# Patient Record
Sex: FEMALE | Race: WHITE | Employment: PART TIME | URBAN - NONMETROPOLITAN AREA
[De-identification: names, ages, dates, MRNs, and addresses within clinical notes are randomized per-mention and may not be internally consistent; named-entity substitution may affect disease eponyms.]

---

## 2017-09-06 ENCOUNTER — HOSPITAL ENCOUNTER (INPATIENT)
Age: 37
LOS: 5 days | Discharge: NURSING, REHAB OR LONG-TERM CARE | DRG: 912 | End: 2017-09-11
Attending: INTERNAL MEDICINE | Admitting: SURGERY
Payer: MEDICARE

## 2017-09-06 ENCOUNTER — APPOINTMENT (OUTPATIENT)
Dept: GENERAL RADIOLOGY | Age: 37
DRG: 912 | End: 2017-09-06
Payer: MEDICARE

## 2017-09-06 ENCOUNTER — APPOINTMENT (OUTPATIENT)
Dept: CT IMAGING | Age: 37
DRG: 912 | End: 2017-09-06
Payer: MEDICARE

## 2017-09-06 DIAGNOSIS — S61.412A LACERATION OF LEFT HAND WITHOUT FOREIGN BODY, INITIAL ENCOUNTER: ICD-10-CM
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
## 2017-09-06 DIAGNOSIS — S82.141A TIBIAL PLATEAU FRACTURE, RIGHT, CLOSED, INITIAL ENCOUNTER: ICD-10-CM

## 2017-09-06 DIAGNOSIS — Z91.89: ICD-10-CM

## 2017-09-06 LAB
HEMOGLOBIN: 12.7 GM/DL (ref 12–16)
MRSA SCREEN RT-PCR: NEGATIVE

## 2017-09-06 PROCEDURE — 36415 COLL VENOUS BLD VENIPUNCTURE: CPT

## 2017-09-06 PROCEDURE — 2060000000 HC ICU INTERMEDIATE R&B

## 2017-09-06 PROCEDURE — 96374 THER/PROPH/DIAG INJ IV PUSH: CPT

## 2017-09-06 PROCEDURE — 73560 X-RAY EXAM OF KNEE 1 OR 2: CPT

## 2017-09-06 PROCEDURE — 6820000001 HC L2 TRAUMA SURGERY EVALUATION

## 2017-09-06 PROCEDURE — 99285 EMERGENCY DEPT VISIT HI MDM: CPT

## 2017-09-06 PROCEDURE — 96375 TX/PRO/DX INJ NEW DRUG ADDON: CPT

## 2017-09-06 PROCEDURE — 87081 CULTURE SCREEN ONLY: CPT

## 2017-09-06 PROCEDURE — 6360000004 HC RX CONTRAST MEDICATION: Performed by: INTERNAL MEDICINE

## 2017-09-06 PROCEDURE — 99223 1ST HOSP IP/OBS HIGH 75: CPT | Performed by: SURGERY

## 2017-09-06 PROCEDURE — 73080 X-RAY EXAM OF ELBOW: CPT

## 2017-09-06 PROCEDURE — 74177 CT ABD & PELVIS W/CONTRAST: CPT

## 2017-09-06 PROCEDURE — 72072 X-RAY EXAM THORAC SPINE 3VWS: CPT

## 2017-09-06 PROCEDURE — C9113 INJ PANTOPRAZOLE SODIUM, VIA: HCPCS | Performed by: SURGERY

## 2017-09-06 PROCEDURE — 6370000000 HC RX 637 (ALT 250 FOR IP): Performed by: SURGERY

## 2017-09-06 PROCEDURE — 6360000002 HC RX W HCPCS: Performed by: SURGERY

## 2017-09-06 PROCEDURE — 2580000003 HC RX 258: Performed by: SURGERY

## 2017-09-06 PROCEDURE — 76376 3D RENDER W/INTRP POSTPROCES: CPT

## 2017-09-06 PROCEDURE — 0LQ80ZZ REPAIR LEFT HAND TENDON, OPEN APPROACH: ICD-10-PCS | Performed by: ORTHOPAEDIC SURGERY

## 2017-09-06 PROCEDURE — 85018 HEMOGLOBIN: CPT

## 2017-09-06 PROCEDURE — A6454 SELF-ADHER BAND W>=3" <5"/YD: HCPCS

## 2017-09-06 PROCEDURE — 87641 MR-STAPH DNA AMP PROBE: CPT

## 2017-09-06 PROCEDURE — 6360000002 HC RX W HCPCS: Performed by: INTERNAL MEDICINE

## 2017-09-06 RX ORDER — VENLAFAXINE 37.5 MG/1
75 TABLET ORAL DAILY
Status: DISCONTINUED | OUTPATIENT
Start: 2017-09-06 | End: 2017-09-11 | Stop reason: HOSPADM

## 2017-09-06 RX ORDER — ACETAMINOPHEN 500 MG
1000 TABLET ORAL EVERY 6 HOURS PRN
Status: ON HOLD | COMMUNITY
End: 2017-09-11

## 2017-09-06 RX ORDER — ONDANSETRON 2 MG/ML
4 INJECTION INTRAMUSCULAR; INTRAVENOUS ONCE
Status: COMPLETED | OUTPATIENT
Start: 2017-09-06 | End: 2017-09-06

## 2017-09-06 RX ORDER — SODIUM CHLORIDE 0.9 % (FLUSH) 0.9 %
10 SYRINGE (ML) INJECTION EVERY 12 HOURS SCHEDULED
Status: DISCONTINUED | OUTPATIENT
Start: 2017-09-06 | End: 2017-09-11 | Stop reason: HOSPADM

## 2017-09-06 RX ORDER — SODIUM CHLORIDE 9 MG/ML
INJECTION, SOLUTION INTRAVENOUS CONTINUOUS
Status: DISCONTINUED | OUTPATIENT
Start: 2017-09-06 | End: 2017-09-07

## 2017-09-06 RX ORDER — LIDOCAINE 50 MG/G
2 PATCH TOPICAL DAILY
Status: DISCONTINUED | OUTPATIENT
Start: 2017-09-06 | End: 2017-09-07

## 2017-09-06 RX ORDER — ONDANSETRON 2 MG/ML
4 INJECTION INTRAMUSCULAR; INTRAVENOUS EVERY 6 HOURS PRN
Status: DISCONTINUED | OUTPATIENT
Start: 2017-09-06 | End: 2017-09-11 | Stop reason: HOSPADM

## 2017-09-06 RX ORDER — SODIUM CHLORIDE 0.9 % (FLUSH) 0.9 %
10 SYRINGE (ML) INJECTION PRN
Status: DISCONTINUED | OUTPATIENT
Start: 2017-09-06 | End: 2017-09-11 | Stop reason: HOSPADM

## 2017-09-06 RX ORDER — VENLAFAXINE 75 MG/1
75 TABLET ORAL DAILY
COMMUNITY

## 2017-09-06 RX ORDER — 0.9 % SODIUM CHLORIDE 0.9 %
10 VIAL (ML) INJECTION DAILY
Status: DISCONTINUED | OUTPATIENT
Start: 2017-09-06 | End: 2017-09-07

## 2017-09-06 RX ORDER — IBUPROFEN 200 MG
200 TABLET ORAL EVERY 6 HOURS PRN
Status: ON HOLD | COMMUNITY
End: 2017-09-11

## 2017-09-06 RX ORDER — ACETAMINOPHEN 325 MG/1
650 TABLET ORAL EVERY 4 HOURS PRN
Status: DISCONTINUED | OUTPATIENT
Start: 2017-09-06 | End: 2017-09-11 | Stop reason: HOSPADM

## 2017-09-06 RX ORDER — KETOROLAC TROMETHAMINE 30 MG/ML
30 INJECTION, SOLUTION INTRAMUSCULAR; INTRAVENOUS EVERY 6 HOURS PRN
Status: DISCONTINUED | OUTPATIENT
Start: 2017-09-06 | End: 2017-09-11 | Stop reason: HOSPADM

## 2017-09-06 RX ORDER — PANTOPRAZOLE SODIUM 40 MG/10ML
40 INJECTION, POWDER, LYOPHILIZED, FOR SOLUTION INTRAVENOUS DAILY
Status: DISCONTINUED | OUTPATIENT
Start: 2017-09-06 | End: 2017-09-07

## 2017-09-06 RX ADMIN — HYDROMORPHONE HYDROCHLORIDE 1 MG: 1 INJECTION, SOLUTION INTRAMUSCULAR; INTRAVENOUS; SUBCUTANEOUS at 17:20

## 2017-09-06 RX ADMIN — HYDROMORPHONE HYDROCHLORIDE 1 MG: 1 INJECTION, SOLUTION INTRAMUSCULAR; INTRAVENOUS; SUBCUTANEOUS at 23:59

## 2017-09-06 RX ADMIN — PANTOPRAZOLE SODIUM 40 MG: 40 INJECTION, POWDER, FOR SOLUTION INTRAVENOUS at 21:32

## 2017-09-06 RX ADMIN — IOPAMIDOL 80 ML: 755 INJECTION, SOLUTION INTRAVENOUS at 16:26

## 2017-09-06 RX ADMIN — HYDROMORPHONE HYDROCHLORIDE 1 MG: 1 INJECTION, SOLUTION INTRAMUSCULAR; INTRAVENOUS; SUBCUTANEOUS at 19:41

## 2017-09-06 RX ADMIN — Medication 10 ML: at 21:32

## 2017-09-06 RX ADMIN — HYDROMORPHONE HYDROCHLORIDE 1 MG: 1 INJECTION, SOLUTION INTRAMUSCULAR; INTRAVENOUS; SUBCUTANEOUS at 21:54

## 2017-09-06 RX ADMIN — ONDANSETRON 4 MG: 2 INJECTION INTRAMUSCULAR; INTRAVENOUS at 17:20

## 2017-09-06 RX ADMIN — VENLAFAXINE 75 MG: 37.5 TABLET ORAL at 21:32

## 2017-09-06 RX ADMIN — SODIUM CHLORIDE: 9 INJECTION, SOLUTION INTRAVENOUS at 21:31

## 2017-09-06 ASSESSMENT — PAIN SCALES - GENERAL
PAINLEVEL_OUTOF10: 8
PAINLEVEL_OUTOF10: 7
PAINLEVEL_OUTOF10: 9
PAINLEVEL_OUTOF10: 8
PAINLEVEL_OUTOF10: 9
PAINLEVEL_OUTOF10: 9
PAINLEVEL_OUTOF10: 8
PAINLEVEL_OUTOF10: 10
PAINLEVEL_OUTOF10: 8

## 2017-09-06 ASSESSMENT — ENCOUNTER SYMPTOMS
ABDOMINAL PAIN: 0
EYE DISCHARGE: 0
SORE THROAT: 0
VOMITING: 0
DIARRHEA: 0
WHEEZING: 0
NAUSEA: 0
COUGH: 0
BACK PAIN: 1
EYE PAIN: 0
RHINORRHEA: 0
SHORTNESS OF BREATH: 0

## 2017-09-06 ASSESSMENT — PAIN DESCRIPTION - FREQUENCY
FREQUENCY: CONTINUOUS
FREQUENCY: CONTINUOUS

## 2017-09-06 ASSESSMENT — PAIN DESCRIPTION - LOCATION
LOCATION: CHEST;RIB CAGE
LOCATION: BACK

## 2017-09-06 ASSESSMENT — PAIN DESCRIPTION - ORIENTATION: ORIENTATION: LOWER

## 2017-09-06 ASSESSMENT — PAIN DESCRIPTION - PAIN TYPE
TYPE: ACUTE PAIN

## 2017-09-06 NOTE — ED NOTES
Bed: 003A  Expected date: 9/6/17  Expected time:   Means of arrival:   Comments:  [id number] Huan Torres RN  09/06/17 9317
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
## 2017-09-06 NOTE — ED NOTES
Pt resting in bed. Dr. Kamar Gunter and DR. Killian at bedside to assess pt. Dr. Latasha Mccord changed pt's hand dressing at this time. VSS. Family at bedside visiting. C-collar maintained. Call light within reach.      Doc MAYCO Copploa  09/06/17 3854
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
## 2017-09-07 ENCOUNTER — APPOINTMENT (OUTPATIENT)
Dept: GENERAL RADIOLOGY | Age: 37
DRG: 912 | End: 2017-09-07
Payer: MEDICARE

## 2017-09-07 ENCOUNTER — APPOINTMENT (OUTPATIENT)
Dept: CT IMAGING | Age: 37
DRG: 912 | End: 2017-09-07
Payer: MEDICARE

## 2017-09-07 ENCOUNTER — ANESTHESIA (OUTPATIENT)
Dept: OPERATING ROOM | Age: 37
DRG: 912 | End: 2017-09-07
Payer: MEDICARE

## 2017-09-07 ENCOUNTER — ANESTHESIA EVENT (OUTPATIENT)
Dept: OPERATING ROOM | Age: 37
DRG: 912 | End: 2017-09-07
Payer: MEDICARE

## 2017-09-07 VITALS
DIASTOLIC BLOOD PRESSURE: 78 MMHG | OXYGEN SATURATION: 100 % | SYSTOLIC BLOOD PRESSURE: 119 MMHG | RESPIRATION RATE: 2 BRPM

## 2017-09-07 PROBLEM — S82.141A TIBIAL PLATEAU FRACTURE, RIGHT: Status: ACTIVE | Noted: 2017-09-07

## 2017-09-07 PROBLEM — S22.21XA FRACTURE OF MANUBRIUM: Status: ACTIVE | Noted: 2017-09-07

## 2017-09-07 PROBLEM — S12.500A FRACTURE OF SIXTH CERVICAL VERTEBRA (HCC): Status: ACTIVE | Noted: 2017-09-07

## 2017-09-07 PROBLEM — S90.511A ABRASION OF RIGHT ANKLE: Status: ACTIVE | Noted: 2017-09-07

## 2017-09-07 PROBLEM — S22.43XA FRACTURE OF MULTIPLE RIBS OF BOTH SIDES: Status: ACTIVE | Noted: 2017-09-07

## 2017-09-07 PROBLEM — S32.009A LUMBAR TRANSVERSE PROCESS FRACTURE (HCC): Status: ACTIVE | Noted: 2017-09-07

## 2017-09-07 PROBLEM — S61.412A LACERATION OF LEFT HAND: Status: ACTIVE | Noted: 2017-09-07

## 2017-09-07 PROBLEM — S27.892A MEDIASTINAL HEMATOMA: Status: ACTIVE | Noted: 2017-09-07

## 2017-09-07 PROBLEM — Z91.89 AT HIGH RISK FOR ACUTE PAIN: Status: ACTIVE | Noted: 2017-09-07

## 2017-09-07 PROBLEM — S66.822A EXTENSOR TENDON LACERATION OF LEFT HAND WITH OPEN WOUND: Status: ACTIVE | Noted: 2017-09-07

## 2017-09-07 PROBLEM — S61.402A EXTENSOR TENDON LACERATION OF LEFT HAND WITH OPEN WOUND: Status: ACTIVE | Noted: 2017-09-07

## 2017-09-07 LAB
ANION GAP SERPL CALCULATED.3IONS-SCNC: 12 MEQ/L (ref 8–16)
BUN BLDV-MCNC: 12 MG/DL (ref 7–22)
CALCIUM SERPL-MCNC: 8.7 MG/DL (ref 8.5–10.5)
CHLORIDE BLD-SCNC: 103 MEQ/L (ref 98–111)
CO2: 23 MEQ/L (ref 23–33)
CREAT SERPL-MCNC: 0.5 MG/DL (ref 0.4–1.2)
GFR SERPL CREATININE-BSD FRML MDRD: > 90 ML/MIN/1.73M2
GLUCOSE BLD-MCNC: 109 MG/DL (ref 70–108)
HCT VFR BLD CALC: 31.7 % (ref 37–47)
HEMOGLOBIN: 10.7 GM/DL (ref 12–16)
HEMOGLOBIN: 10.9 GM/DL (ref 12–16)
HEMOGLOBIN: 11.3 GM/DL (ref 12–16)
MCH RBC QN AUTO: 30.3 PG (ref 27–31)
MCHC RBC AUTO-ENTMCNC: 33.8 GM/DL (ref 33–37)
MCV RBC AUTO: 89.6 FL (ref 81–99)
OSMOLALITY CALCULATION: 276 MOSMOL/KG (ref 275–300)
PDW BLD-RTO: 14.7 % (ref 11.5–14.5)
PLATELET # BLD: 163 THOU/MM3 (ref 130–400)
PMV BLD AUTO: 8.7 MCM (ref 7.4–10.4)
POTASSIUM SERPL-SCNC: 4.2 MEQ/L (ref 3.5–5.2)
RBC # BLD: 3.54 MILL/MM3 (ref 4.2–5.4)
SODIUM BLD-SCNC: 138 MEQ/L (ref 135–145)
WBC # BLD: 6 THOU/MM3 (ref 4.8–10.8)

## 2017-09-07 PROCEDURE — 99233 SBSQ HOSP IP/OBS HIGH 50: CPT | Performed by: SURGERY

## 2017-09-07 PROCEDURE — 3700000001 HC ADD 15 MINUTES (ANESTHESIA): Performed by: ORTHOPAEDIC SURGERY

## 2017-09-07 PROCEDURE — 7100000000 HC PACU RECOVERY - FIRST 15 MIN: Performed by: ORTHOPAEDIC SURGERY

## 2017-09-07 PROCEDURE — 71010 XR CHEST PORTABLE: CPT

## 2017-09-07 PROCEDURE — 6360000002 HC RX W HCPCS: Performed by: SURGERY

## 2017-09-07 PROCEDURE — 3700000000 HC ANESTHESIA ATTENDED CARE: Performed by: ORTHOPAEDIC SURGERY

## 2017-09-07 PROCEDURE — 6360000002 HC RX W HCPCS: Performed by: NURSE PRACTITIONER

## 2017-09-07 PROCEDURE — 6360000002 HC RX W HCPCS: Performed by: NURSE ANESTHETIST, CERTIFIED REGISTERED

## 2017-09-07 PROCEDURE — 80048 BASIC METABOLIC PNL TOTAL CA: CPT

## 2017-09-07 PROCEDURE — 2580000003 HC RX 258: Performed by: SURGERY

## 2017-09-07 PROCEDURE — 72128 CT CHEST SPINE W/O DYE: CPT

## 2017-09-07 PROCEDURE — 36415 COLL VENOUS BLD VENIPUNCTURE: CPT

## 2017-09-07 PROCEDURE — 6370000000 HC RX 637 (ALT 250 FOR IP): Performed by: NURSE PRACTITIONER

## 2017-09-07 PROCEDURE — 85027 COMPLETE CBC AUTOMATED: CPT

## 2017-09-07 PROCEDURE — 6360000002 HC RX W HCPCS

## 2017-09-07 PROCEDURE — 94010 BREATHING CAPACITY TEST: CPT

## 2017-09-07 PROCEDURE — C9113 INJ PANTOPRAZOLE SODIUM, VIA: HCPCS | Performed by: SURGERY

## 2017-09-07 PROCEDURE — 3600000013 HC SURGERY LEVEL 3 ADDTL 15MIN: Performed by: ORTHOPAEDIC SURGERY

## 2017-09-07 PROCEDURE — 99999 PR OFFICE/OUTPT VISIT,PROCEDURE ONLY: CPT | Performed by: NURSE PRACTITIONER

## 2017-09-07 PROCEDURE — 2500000003 HC RX 250 WO HCPCS: Performed by: NURSE ANESTHETIST, CERTIFIED REGISTERED

## 2017-09-07 PROCEDURE — 92523 SPEECH SOUND LANG COMPREHEN: CPT

## 2017-09-07 PROCEDURE — 85018 HEMOGLOBIN: CPT

## 2017-09-07 PROCEDURE — 2580000003 HC RX 258: Performed by: NURSE ANESTHETIST, CERTIFIED REGISTERED

## 2017-09-07 PROCEDURE — 3600000003 HC SURGERY LEVEL 3 BASE: Performed by: ORTHOPAEDIC SURGERY

## 2017-09-07 PROCEDURE — A6446 CONFORM BAND S W>=3" <5"/YD: HCPCS | Performed by: ORTHOPAEDIC SURGERY

## 2017-09-07 PROCEDURE — 6370000000 HC RX 637 (ALT 250 FOR IP): Performed by: SURGERY

## 2017-09-07 PROCEDURE — 7100000001 HC PACU RECOVERY - ADDTL 15 MIN: Performed by: ORTHOPAEDIC SURGERY

## 2017-09-07 PROCEDURE — 2060000000 HC ICU INTERMEDIATE R&B

## 2017-09-07 PROCEDURE — A6223 GAUZE >16<=48 NO W/SAL W/O B: HCPCS | Performed by: ORTHOPAEDIC SURGERY

## 2017-09-07 RX ORDER — PROPOFOL 10 MG/ML
INJECTION, EMULSION INTRAVENOUS PRN
Status: DISCONTINUED | OUTPATIENT
Start: 2017-09-07 | End: 2017-09-07 | Stop reason: SDUPTHER

## 2017-09-07 RX ORDER — LIDOCAINE HYDROCHLORIDE 20 MG/ML
INJECTION, SOLUTION INFILTRATION; PERINEURAL PRN
Status: DISCONTINUED | OUTPATIENT
Start: 2017-09-07 | End: 2017-09-07 | Stop reason: SDUPTHER

## 2017-09-07 RX ORDER — SODIUM CHLORIDE 9 MG/ML
INJECTION, SOLUTION INTRAVENOUS CONTINUOUS PRN
Status: DISCONTINUED | OUTPATIENT
Start: 2017-09-07 | End: 2017-09-07 | Stop reason: SDUPTHER

## 2017-09-07 RX ORDER — FENTANYL CITRATE 50 UG/ML
INJECTION, SOLUTION INTRAMUSCULAR; INTRAVENOUS
Status: COMPLETED
Start: 2017-09-07 | End: 2017-09-07

## 2017-09-07 RX ORDER — LIDOCAINE 50 MG/G
3 PATCH TOPICAL DAILY
Status: COMPLETED | OUTPATIENT
Start: 2017-09-08 | End: 2017-09-11

## 2017-09-07 RX ORDER — ONDANSETRON 2 MG/ML
INJECTION INTRAMUSCULAR; INTRAVENOUS PRN
Status: DISCONTINUED | OUTPATIENT
Start: 2017-09-07 | End: 2017-09-07 | Stop reason: SDUPTHER

## 2017-09-07 RX ORDER — CYCLOBENZAPRINE HCL 10 MG
10 TABLET ORAL 3 TIMES DAILY PRN
Status: DISCONTINUED | OUTPATIENT
Start: 2017-09-07 | End: 2017-09-08

## 2017-09-07 RX ORDER — MIDAZOLAM HYDROCHLORIDE 1 MG/ML
INJECTION INTRAMUSCULAR; INTRAVENOUS PRN
Status: DISCONTINUED | OUTPATIENT
Start: 2017-09-07 | End: 2017-09-07 | Stop reason: SDUPTHER

## 2017-09-07 RX ORDER — FAMOTIDINE 20 MG/1
20 TABLET, FILM COATED ORAL 2 TIMES DAILY
Status: DISCONTINUED | OUTPATIENT
Start: 2017-09-07 | End: 2017-09-11 | Stop reason: HOSPADM

## 2017-09-07 RX ORDER — HYDROCODONE BITARTRATE AND ACETAMINOPHEN 5; 325 MG/1; MG/1
2 TABLET ORAL EVERY 4 HOURS PRN
Status: DISCONTINUED | OUTPATIENT
Start: 2017-09-07 | End: 2017-09-11 | Stop reason: HOSPADM

## 2017-09-07 RX ORDER — FENTANYL CITRATE 50 UG/ML
INJECTION, SOLUTION INTRAMUSCULAR; INTRAVENOUS PRN
Status: DISCONTINUED | OUTPATIENT
Start: 2017-09-07 | End: 2017-09-07 | Stop reason: SDUPTHER

## 2017-09-07 RX ORDER — HYDROXYZINE HYDROCHLORIDE 10 MG/1
10 TABLET, FILM COATED ORAL EVERY 6 HOURS PRN
Status: DISCONTINUED | OUTPATIENT
Start: 2017-09-07 | End: 2017-09-11 | Stop reason: HOSPADM

## 2017-09-07 RX ORDER — HYDROCODONE BITARTRATE AND ACETAMINOPHEN 5; 325 MG/1; MG/1
1-2 TABLET ORAL EVERY 4 HOURS PRN
Qty: 70 TABLET | Refills: 0 | Status: ON HOLD | OUTPATIENT
Start: 2017-09-07 | End: 2017-09-25 | Stop reason: HOSPADM

## 2017-09-07 RX ORDER — POLYETHYLENE GLYCOL 3350 17 G/17G
17 POWDER, FOR SOLUTION ORAL DAILY
Status: DISCONTINUED | OUTPATIENT
Start: 2017-09-07 | End: 2017-09-11

## 2017-09-07 RX ORDER — DOCUSATE SODIUM 100 MG/1
100 CAPSULE, LIQUID FILLED ORAL 2 TIMES DAILY
Status: DISCONTINUED | OUTPATIENT
Start: 2017-09-07 | End: 2017-09-11 | Stop reason: HOSPADM

## 2017-09-07 RX ORDER — HYDROCODONE BITARTRATE AND ACETAMINOPHEN 5; 325 MG/1; MG/1
1 TABLET ORAL EVERY 4 HOURS PRN
Status: DISCONTINUED | OUTPATIENT
Start: 2017-09-07 | End: 2017-09-11 | Stop reason: HOSPADM

## 2017-09-07 RX ORDER — FENTANYL CITRATE 50 UG/ML
50 INJECTION, SOLUTION INTRAMUSCULAR; INTRAVENOUS EVERY 5 MIN PRN
Status: DISCONTINUED | OUTPATIENT
Start: 2017-09-07 | End: 2017-09-07 | Stop reason: HOSPADM

## 2017-09-07 RX ADMIN — FENTANYL CITRATE 50 MCG: 50 INJECTION, SOLUTION INTRAMUSCULAR; INTRAVENOUS at 11:48

## 2017-09-07 RX ADMIN — HYDROXYZINE HYDROCHLORIDE 10 MG: 10 TABLET ORAL at 21:23

## 2017-09-07 RX ADMIN — FAMOTIDINE 20 MG: 20 TABLET, FILM COATED ORAL at 21:16

## 2017-09-07 RX ADMIN — FAMOTIDINE 20 MG: 20 TABLET, FILM COATED ORAL at 12:41

## 2017-09-07 RX ADMIN — SODIUM CHLORIDE: 9 INJECTION, SOLUTION INTRAVENOUS at 05:40

## 2017-09-07 RX ADMIN — HYDROMORPHONE HYDROCHLORIDE 1 MG: 1 INJECTION, SOLUTION INTRAMUSCULAR; INTRAVENOUS; SUBCUTANEOUS at 03:51

## 2017-09-07 RX ADMIN — HYDROMORPHONE HYDROCHLORIDE 1 MG: 1 INJECTION, SOLUTION INTRAMUSCULAR; INTRAVENOUS; SUBCUTANEOUS at 12:42

## 2017-09-07 RX ADMIN — HYDROMORPHONE HYDROCHLORIDE 1 MG: 1 INJECTION, SOLUTION INTRAMUSCULAR; INTRAVENOUS; SUBCUTANEOUS at 16:53

## 2017-09-07 RX ADMIN — CEFAZOLIN SODIUM 2 G: 2 SOLUTION INTRAVENOUS at 14:30

## 2017-09-07 RX ADMIN — FENTANYL CITRATE 50 MCG: 50 INJECTION INTRAMUSCULAR; INTRAVENOUS at 11:48

## 2017-09-07 RX ADMIN — HYDROMORPHONE HYDROCHLORIDE 1 MG: 1 INJECTION, SOLUTION INTRAMUSCULAR; INTRAVENOUS; SUBCUTANEOUS at 06:13

## 2017-09-07 RX ADMIN — VENLAFAXINE 75 MG: 37.5 TABLET ORAL at 08:22

## 2017-09-07 RX ADMIN — CEFAZOLIN SODIUM 2 G: 2 SOLUTION INTRAVENOUS at 21:22

## 2017-09-07 RX ADMIN — Medication 10 ML: at 06:14

## 2017-09-07 RX ADMIN — HYDROMORPHONE HYDROCHLORIDE 1 MG: 1 INJECTION, SOLUTION INTRAMUSCULAR; INTRAVENOUS; SUBCUTANEOUS at 08:22

## 2017-09-07 RX ADMIN — ONDANSETRON 4 MG: 2 INJECTION INTRAMUSCULAR; INTRAVENOUS at 03:56

## 2017-09-07 RX ADMIN — CYCLOBENZAPRINE 10 MG: 10 TABLET, FILM COATED ORAL at 17:30

## 2017-09-07 RX ADMIN — PANTOPRAZOLE SODIUM 40 MG: 40 INJECTION, POWDER, FOR SOLUTION INTRAVENOUS at 06:13

## 2017-09-07 RX ADMIN — LIDOCAINE HYDROCHLORIDE 60 MG: 20 INJECTION, SOLUTION INFILTRATION; PERINEURAL at 10:13

## 2017-09-07 RX ADMIN — PROPOFOL 120 MG: 10 INJECTION, EMULSION INTRAVENOUS at 10:14

## 2017-09-07 RX ADMIN — POLYETHYLENE GLYCOL 3350 17 G: 17 POWDER, FOR SOLUTION ORAL at 16:58

## 2017-09-07 RX ADMIN — DOCUSATE SODIUM 100 MG: 100 CAPSULE ORAL at 12:41

## 2017-09-07 RX ADMIN — MIDAZOLAM HYDROCHLORIDE 2 MG: 1 INJECTION INTRAMUSCULAR; INTRAVENOUS at 10:07

## 2017-09-07 RX ADMIN — FENTANYL CITRATE 100 MCG: 50 INJECTION INTRAMUSCULAR; INTRAVENOUS at 10:13

## 2017-09-07 RX ADMIN — DOCUSATE SODIUM 100 MG: 100 CAPSULE ORAL at 21:16

## 2017-09-07 RX ADMIN — CYCLOBENZAPRINE 10 MG: 10 TABLET, FILM COATED ORAL at 21:16

## 2017-09-07 RX ADMIN — SODIUM CHLORIDE: 9 INJECTION, SOLUTION INTRAVENOUS at 10:09

## 2017-09-07 RX ADMIN — HYDROCODONE BITARTRATE AND ACETAMINOPHEN 2 TABLET: 5; 325 TABLET ORAL at 14:45

## 2017-09-07 RX ADMIN — HYDROMORPHONE HYDROCHLORIDE 1 MG: 1 INJECTION, SOLUTION INTRAMUSCULAR; INTRAVENOUS; SUBCUTANEOUS at 21:16

## 2017-09-07 RX ADMIN — ONDANSETRON 4 MG: 2 INJECTION INTRAMUSCULAR; INTRAVENOUS at 10:48

## 2017-09-07 RX ADMIN — Medication 10 ML: at 21:17

## 2017-09-07 RX ADMIN — CEFAZOLIN SODIUM 2 G: 1 INJECTION, SOLUTION INTRAVENOUS at 10:22

## 2017-09-07 ASSESSMENT — PULMONARY FUNCTION TESTS
PIF_VALUE: 2
PIF_VALUE: 19
PIF_VALUE: 33
PIF_VALUE: 19
PIF_VALUE: 1
PIF_VALUE: 5
PIF_VALUE: 19
PIF_VALUE: 19
PIF_VALUE: 1
PIF_VALUE: 19
PIF_VALUE: 7
PIF_VALUE: 17
PIF_VALUE: 19
PIF_VALUE: 19
PIF_VALUE: 4
PIF_VALUE: 12
PIF_VALUE: 1
PIF_VALUE: 19
PIF_VALUE: 1
PIF_VALUE: 1
PIF_VALUE: 19
PIF_VALUE: 3
PIF_VALUE: 19
PIF_VALUE: 6
PIF_VALUE: 19

## 2017-09-07 ASSESSMENT — PAIN SCALES - GENERAL
PAINLEVEL_OUTOF10: 7
PAINLEVEL_OUTOF10: 9
PAINLEVEL_OUTOF10: 8
PAINLEVEL_OUTOF10: 6
PAINLEVEL_OUTOF10: 7
PAINLEVEL_OUTOF10: 0
PAINLEVEL_OUTOF10: 0
PAINLEVEL_OUTOF10: 8
PAINLEVEL_OUTOF10: 7
PAINLEVEL_OUTOF10: 8
PAINLEVEL_OUTOF10: 10
PAINLEVEL_OUTOF10: 8
PAINLEVEL_OUTOF10: 8

## 2017-09-07 ASSESSMENT — PAIN DESCRIPTION - ORIENTATION
ORIENTATION: LEFT
ORIENTATION: RIGHT
ORIENTATION: RIGHT
ORIENTATION: LEFT

## 2017-09-07 ASSESSMENT — PAIN DESCRIPTION - PAIN TYPE
TYPE: ACUTE PAIN

## 2017-09-07 ASSESSMENT — PAIN DESCRIPTION - FREQUENCY: FREQUENCY: CONTINUOUS

## 2017-09-07 ASSESSMENT — PAIN DESCRIPTION - LOCATION
LOCATION: RIB CAGE
LOCATION: RIB CAGE
LOCATION: KNEE
LOCATION: RIB CAGE
LOCATION: RIB CAGE
LOCATION: KNEE

## 2017-09-07 NOTE — PROGRESS NOTES
Respiratory Care is following the rib fracture order set. Patient's position when testing was done was semi-fowlers. A Negative Inspiratory Force (NIF) was performed with patient achieving a NIF of -40 cm H2O. The NIF was greater than 25 cm H2O. A Forced Vital Capacity (FVC) was obtained with patient achieving an FVC of 1.93 liters. The patient's calculated ideal body weight, (IBW) is  57.3 kg. 0.020 liters/kg of the patient's IBW is 1.146 liters. The patient's FVC was greater than 0.020 liters/kg of IBW. Based on the spirometry measurement alone, patient does not meet ICU admission criteria. Previous FVC was 2.09 liters and previous NIF was -40 cm H2O. Patient's NIF is same and FVC slightly lower from previous screening(s). Last pain medication was given on  9-7-17 @ 1242. Physician was not called regarding spirometry measurement.

## 2017-09-07 NOTE — PLAN OF CARE
Problem: Pain:  Goal: Pain level will decrease  Pain level will decrease   Outcome: Ongoing  Pt states pain 7-9/10, rib cage and sternal pain is worst, lt hand, rt elbow, rt leg and knee pain  Goal: Control of acute pain  Control of acute pain   Outcome: Ongoing  Pt states pain 7-9/10, rib cage and sternal pain is worst, lt hand, rt elbow, rt leg and knee pain  Goal: Control of chronic pain  Control of chronic pain   Outcome: Ongoing  Pt states pain 7-9/10, rib cage and sternal pain is worst, lt hand, rt elbow, rt leg and knee pain    Problem: Falls - Risk of  Goal: Absence of falls  Outcome: Ongoing  No falls this shift, pt on bed rest    Problem: Risk for Impaired Skin Integrity  Goal: Tissue integrity - skin and mucous membranes  Structural intactness and normal physiological function of skin and  mucous membranes. Outcome: Ongoing  Multiple scattered abrasions and bruising. Swollen rt elbow and lt hand. Problem: Urinary Elimination:  Goal: Complications related to the disease process, condition or treatment will be avoided or minimized  Complications related to the disease process, condition or treatment will be avoided or minimized  Outcome: Ongoing  Insertion site pink, urine clear, yellow, no odor noted. Comments:   Care plan reviewed with patient and family. Patient and family verbalize understanding of the plan of care and contribute to goal setting.

## 2017-09-07 NOTE — CONSULTS
(LIDODERM) 5 % 2 patch  2 patch Transdermal Daily Martha Raya MD   2 patch at 09/07/17 9598         Allergies:  Codeine; Morphine; and Percocet [oxycodone-acetaminophen]    Social History:   Negative for tobacco use, alcohol abuse and illicit drug abuse    Family History:  Family History   Problem Relation Age of Onset    Other Mother      lymphedema    High Blood Pressure Mother     Cancer Father      pancreatic    Heart Disease Father     Other Sister          REVIEW OF SYSTEMS:  Gen: Negative for nausea, vomiting, diarrhea, fever, chills, night sweats  HEENT: Negative for double vision, blurred vision, sore throat  Heart: Negative for HTN, palpitations, chest pain  Lungs: Negative for wheezes, asthma or SOB  GI: Negative for nausea, vomiting  : Negative for dysuria, hematuria  Endo: Negative for diabetes, thyroid disorders  Heme: Negative for DVT or bleeding disorders  Psych: Positive for Depression or anxiety  Ortho: Positive for pain in the joints, negative arthritis or gout    PHYSICAL EXAM:  Vitals:    09/07/17 0809   BP: 102/62   Pulse: 98   Resp: 20   Temp: 98.9 °F (37.2 °C)   SpO2: 97%     Gen: alert and oriented  Head: normocephalic atraumatic   Neck: supple  Chest: no audible wheezes  Heart: RRR   Abdomen: soft  Pelvis: stable  Extremity: RUE: abrasions, swelling and bruising right elbow. FROM of elbow, shoulder and wrist. NVI. LUE: 2 in laceration across the 2-4th metatarsals. Unable to extend the 2-4th digits. Sensation intact. BLE: pain to palpation bilateral knees, NVI. Lumbar and Cervical: pain with palpation, sensation intact. DATA:      Radiology: CT head negative. CT cervical spine shows C6 spinous process with 3mm displacement. CT chest shows manubrium fracture and bilateral 3-9th rib fractures. No lung injury. X-ray right elbow negative. CT lumbar spine shows L2-5 transverse process fractures and herniated disc at L5-S1.  X-ray right tib/fib shows a lateral tibial plateau

## 2017-09-07 NOTE — PROGRESS NOTES
1209  Patient meets PACU D/C criteria at this time. Patient is awake and alert on RA and VSS. Patient denies nausea and states pain is tolerable at 4-5 at this time. Report called to [facility].  0206  Patient transported back to  room 13 at this time.
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
## 2017-09-08 ENCOUNTER — APPOINTMENT (OUTPATIENT)
Dept: GENERAL RADIOLOGY | Age: 37
DRG: 912 | End: 2017-09-08
Payer: MEDICARE

## 2017-09-08 LAB
ANION GAP SERPL CALCULATED.3IONS-SCNC: 8 MEQ/L (ref 8–16)
BUN BLDV-MCNC: 12 MG/DL (ref 7–22)
CALCIUM SERPL-MCNC: 8.7 MG/DL (ref 8.5–10.5)
CHLORIDE BLD-SCNC: 104 MEQ/L (ref 98–111)
CO2: 25 MEQ/L (ref 23–33)
CREAT SERPL-MCNC: 0.5 MG/DL (ref 0.4–1.2)
GFR SERPL CREATININE-BSD FRML MDRD: > 90 ML/MIN/1.73M2
GLUCOSE BLD-MCNC: 109 MG/DL (ref 70–108)
HCT VFR BLD CALC: 28.3 % (ref 37–47)
HEMOGLOBIN: 9.5 GM/DL (ref 12–16)
LV EF: 60 %
LVEF MODALITY: NORMAL
MCH RBC QN AUTO: 30.1 PG (ref 27–31)
MCHC RBC AUTO-ENTMCNC: 33.6 GM/DL (ref 33–37)
MCV RBC AUTO: 89.5 FL (ref 81–99)
MRSA SCREEN: NORMAL
PDW BLD-RTO: 14.3 % (ref 11.5–14.5)
PLATELET # BLD: 152 THOU/MM3 (ref 130–400)
PMV BLD AUTO: 8.8 MCM (ref 7.4–10.4)
POTASSIUM SERPL-SCNC: 4 MEQ/L (ref 3.5–5.2)
RBC # BLD: 3.17 MILL/MM3 (ref 4.2–5.4)
SODIUM BLD-SCNC: 137 MEQ/L (ref 135–145)
VRE CULTURE: NORMAL
WBC # BLD: 6 THOU/MM3 (ref 4.8–10.8)

## 2017-09-08 PROCEDURE — 6360000002 HC RX W HCPCS: Performed by: SURGERY

## 2017-09-08 PROCEDURE — G8979 MOBILITY GOAL STATUS: HCPCS

## 2017-09-08 PROCEDURE — 93306 TTE W/DOPPLER COMPLETE: CPT

## 2017-09-08 PROCEDURE — 97530 THERAPEUTIC ACTIVITIES: CPT

## 2017-09-08 PROCEDURE — 99999 PR OFFICE/OUTPT VISIT,PROCEDURE ONLY: CPT | Performed by: NURSE PRACTITIONER

## 2017-09-08 PROCEDURE — 2060000000 HC ICU INTERMEDIATE R&B

## 2017-09-08 PROCEDURE — 97167 OT EVAL HIGH COMPLEX 60 MIN: CPT

## 2017-09-08 PROCEDURE — 80048 BASIC METABOLIC PNL TOTAL CA: CPT

## 2017-09-08 PROCEDURE — 85027 COMPLETE CBC AUTOMATED: CPT

## 2017-09-08 PROCEDURE — 6370000000 HC RX 637 (ALT 250 FOR IP): Performed by: NURSE PRACTITIONER

## 2017-09-08 PROCEDURE — 73610 X-RAY EXAM OF ANKLE: CPT

## 2017-09-08 PROCEDURE — 99233 SBSQ HOSP IP/OBS HIGH 50: CPT | Performed by: SURGERY

## 2017-09-08 PROCEDURE — 36415 COLL VENOUS BLD VENIPUNCTURE: CPT

## 2017-09-08 PROCEDURE — 71010 XR CHEST PORTABLE: CPT

## 2017-09-08 PROCEDURE — 94010 BREATHING CAPACITY TEST: CPT

## 2017-09-08 PROCEDURE — L0130 FLEX THERMOPLASTIC COLLAR MO: HCPCS

## 2017-09-08 PROCEDURE — 2580000003 HC RX 258: Performed by: SURGERY

## 2017-09-08 PROCEDURE — 99254 IP/OBS CNSLTJ NEW/EST MOD 60: CPT | Performed by: PHYSICAL MEDICINE & REHABILITATION

## 2017-09-08 PROCEDURE — 97163 PT EVAL HIGH COMPLEX 45 MIN: CPT

## 2017-09-08 PROCEDURE — G8978 MOBILITY CURRENT STATUS: HCPCS

## 2017-09-08 PROCEDURE — 6370000000 HC RX 637 (ALT 250 FOR IP): Performed by: SURGERY

## 2017-09-08 RX ORDER — TIZANIDINE 4 MG/1
4 TABLET ORAL EVERY 6 HOURS PRN
Status: DISCONTINUED | OUTPATIENT
Start: 2017-09-08 | End: 2017-09-11 | Stop reason: HOSPADM

## 2017-09-08 RX ADMIN — CYCLOBENZAPRINE 10 MG: 10 TABLET, FILM COATED ORAL at 08:28

## 2017-09-08 RX ADMIN — Medication 10 ML: at 15:00

## 2017-09-08 RX ADMIN — HYDROMORPHONE HYDROCHLORIDE 1 MG: 1 INJECTION, SOLUTION INTRAMUSCULAR; INTRAVENOUS; SUBCUTANEOUS at 09:49

## 2017-09-08 RX ADMIN — TIZANIDINE 4 MG: 4 TABLET ORAL at 16:47

## 2017-09-08 RX ADMIN — DOCUSATE SODIUM 100 MG: 100 CAPSULE ORAL at 08:28

## 2017-09-08 RX ADMIN — DOCUSATE SODIUM 100 MG: 100 CAPSULE ORAL at 21:36

## 2017-09-08 RX ADMIN — Medication 10 ML: at 08:31

## 2017-09-08 RX ADMIN — HYDROCODONE BITARTRATE AND ACETAMINOPHEN 2 TABLET: 5; 325 TABLET ORAL at 12:28

## 2017-09-08 RX ADMIN — HYDROMORPHONE HYDROCHLORIDE 1 MG: 1 INJECTION, SOLUTION INTRAMUSCULAR; INTRAVENOUS; SUBCUTANEOUS at 14:56

## 2017-09-08 RX ADMIN — HYDROXYZINE HYDROCHLORIDE 10 MG: 10 TABLET ORAL at 08:06

## 2017-09-08 RX ADMIN — HYDROCODONE BITARTRATE AND ACETAMINOPHEN 2 TABLET: 5; 325 TABLET ORAL at 17:53

## 2017-09-08 RX ADMIN — FAMOTIDINE 20 MG: 20 TABLET, FILM COATED ORAL at 21:36

## 2017-09-08 RX ADMIN — HYDROMORPHONE HYDROCHLORIDE 1 MG: 1 INJECTION, SOLUTION INTRAMUSCULAR; INTRAVENOUS; SUBCUTANEOUS at 00:45

## 2017-09-08 RX ADMIN — HYDROMORPHONE HYDROCHLORIDE 1 MG: 1 INJECTION, SOLUTION INTRAMUSCULAR; INTRAVENOUS; SUBCUTANEOUS at 05:36

## 2017-09-08 RX ADMIN — HYDROMORPHONE HYDROCHLORIDE 1 MG: 1 INJECTION, SOLUTION INTRAMUSCULAR; INTRAVENOUS; SUBCUTANEOUS at 23:42

## 2017-09-08 RX ADMIN — Medication 10 ML: at 21:37

## 2017-09-08 RX ADMIN — HYDROCODONE BITARTRATE AND ACETAMINOPHEN 2 TABLET: 5; 325 TABLET ORAL at 01:55

## 2017-09-08 RX ADMIN — FAMOTIDINE 20 MG: 20 TABLET, FILM COATED ORAL at 08:28

## 2017-09-08 RX ADMIN — HYDROCODONE BITARTRATE AND ACETAMINOPHEN 2 TABLET: 5; 325 TABLET ORAL at 08:06

## 2017-09-08 RX ADMIN — POLYETHYLENE GLYCOL 3350 17 G: 17 POWDER, FOR SOLUTION ORAL at 08:28

## 2017-09-08 RX ADMIN — VENLAFAXINE 75 MG: 37.5 TABLET ORAL at 08:28

## 2017-09-08 ASSESSMENT — PAIN DESCRIPTION - LOCATION
LOCATION: KNEE
LOCATION: GENERALIZED
LOCATION: FOOT
LOCATION: KNEE
LOCATION: KNEE
LOCATION: GENERALIZED
LOCATION: GENERALIZED
LOCATION: KNEE
LOCATION: FOOT
LOCATION: FOOT
LOCATION: KNEE
LOCATION: KNEE
LOCATION: FOOT;CHEST
LOCATION: KNEE

## 2017-09-08 ASSESSMENT — PAIN DESCRIPTION - ORIENTATION
ORIENTATION: RIGHT

## 2017-09-08 ASSESSMENT — PAIN SCALES - GENERAL
PAINLEVEL_OUTOF10: 5
PAINLEVEL_OUTOF10: 8
PAINLEVEL_OUTOF10: 7
PAINLEVEL_OUTOF10: 4
PAINLEVEL_OUTOF10: 0
PAINLEVEL_OUTOF10: 9
PAINLEVEL_OUTOF10: 7
PAINLEVEL_OUTOF10: 8
PAINLEVEL_OUTOF10: 8
PAINLEVEL_OUTOF10: 6
PAINLEVEL_OUTOF10: 7
PAINLEVEL_OUTOF10: 0
PAINLEVEL_OUTOF10: 6
PAINLEVEL_OUTOF10: 5
PAINLEVEL_OUTOF10: 8
PAINLEVEL_OUTOF10: 9
PAINLEVEL_OUTOF10: 0

## 2017-09-08 ASSESSMENT — PAIN DESCRIPTION - FREQUENCY
FREQUENCY: CONTINUOUS

## 2017-09-08 ASSESSMENT — PAIN DESCRIPTION - PAIN TYPE
TYPE: ACUTE PAIN
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
## 2017-09-08 NOTE — PROGRESS NOTES
Entered room and patient was in semi fowlers position in bed. Patient complained of pain in right foot and left hand. Alert and orient x4. Patient complained of pain in right ear area. Noted laceration behind right ear also bruise on right ear lobe. Pupils perrl. Patient denies any vision concerns. C-collar is in place. Patient complains of itching under chin area. Skin is pink, warm, and dry. Skin turgor, and cap refill <2seconds. Left hand and forearm heavily bandaged from elbow to finger tips. Lung sounds clear in all fields. Apical pulse- 90. Bowel sounds active in all 4 quadrants. Brace on right leg from mid thigh to lower leg. Noted severe purple bruising to right foot with +3 edema to lower right leg. Left leg +1 edema with moderate bruising to left foot. Dopler used to note all pedal pulses bilaterally.

## 2017-09-08 NOTE — PROGRESS NOTES
Respiratory Care is following the rib fracture order set. Patient's position when testing was done was in fowlers. A Negative Inspiratory Force (NIF) was performed with patient achieving a NIF of >-40 cm H2O. The NIF was greater than 25 cm H2O. A Forced Vital Capacity (FVC) was obtained with patient achieving an FVC of 2.17 liters. The patient's calculated ideal body weight, (IBW) is  57.3 kg. 0.020 liters/kg of the patient's IBW is 1.146 liters. The patient's FVC was greater than 0.020 liters/kg of IBW. Based on the spirometry measurement alone, patient does not meet ICU admission criteria. Previous FVC was 1.99 liters and previous NIF was -40 cm H2O. Patient's NIF and FVC show no change from previous screening(s). Last pain medication was given on  9/8 @ 0949. Physician was called regarding spirometry measurement.
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
## 2017-09-08 NOTE — PROGRESS NOTES
times    Hearing: Within functional limits       Pain:  Yes. Pain Assessment  Pain Assessment: 0-10  Pain Level: 9  Pain Type: Acute pain  Pain Location: Knee  Pain Orientation: Right       Social/Functional History:    Lives With: Family  Type of Home: House  Home Layout: Two level, Performs ADL's on one level, Able to Live on Main level with bedroom/bathroom  Home Access: Stairs to enter with rails  Entrance Stairs - Number of Steps: 3  Home Equipment: Cane     Bathroom Shower/Tub: Tub/Shower unit, Walk-in shower  Bathroom Toilet: Standard  Bathroom Equipment:  (has a riser available)    Receives Help From: Family  ADL Assistance: Independent  Homemaking Assistance: Independent  Homemaking Responsibilities: Yes  Ambulation Assistance: Independent  Transfer Assistance: Independent    Objective:     RLE AROM:  (not tested due to tibial plateau fx and knee immobilizer)  LLE AROM : WFL    Strength RLE:  (knee flex/ext not tested due to tibial plateau fx and knee immobilizer. mod A for SLR. Ankle not tested due to pending x-ray)  Strength LLE: WFL        Balance  Sitting - Static: Good  Sitting - Dynamic: Good  Standing - Static: Fair, +  Standing - Dynamic: Fair    Rolling to Left: Contact guard assistance (HOB down; used BR)  Supine to Sit: Minimal assistance (move legs over EOB)  Scooting: Moderate assistance (move R hip out to EOB)    Transfers  Sit to Stand: mod A  (x2 with RW;  platform on L)  Stand to sit: mod A (x2 with RW;  platform on L)  Comment: Also performed slideboard transfer from bed to [facility] going toward L side. Required mod Ax2, CGAx1      Exercises:  Comments: none; pt declined        Activity Tolerance:  Activity Tolerance: Patient limited by pain  Activity Tolerance: Pt able to complete bed mobility tasks with min to mod A. First attempt at transfer was with RW with L platform. Pt able to stand with mod Ax2, but unable to hop due to pain. This was attempted twice.  Third transfer was with slideboard to wheelchair (mod Ax2, CGAx1). Treatment Initiated: sit <-> stand with RW mod Ax2 able to maintain NWB RLE, pre-gait activities, pt required frequent rest breaks    Assessment: Body structures, Functions, Activity limitations: Decreased functional mobility , Decreased ROM, Decreased strength, Decreased endurance, Decreased balance  Assessment: Pt able to complete bed mobility tasks with min to mod A. First attempt at transfer was with RW with L platform. Pt able to stand with mod Ax2, but unable to hop due to pain. This was attempted twice. Third transfer was with slideboard to wheelchair (mod Ax2, CGAx1). Pt able to maintain NWB RLE and NWB L hand throughout entire session. Pt has decreased functional mobility, ROM, strength, endurance, and balance. Pt to benefit from continued therapy. Prognosis: Good    Clinical Presentation: High - Unstable with Unpredictable Characteristics: Pt has multiple fx throughout and is NWB RLE and L hand. In a lot of pain. Needs assist for bed mobility and transfers. Unable to amb due to pain. Pt has decreased functional mobility, ROM, strength, endurance, and balance.:    Decision Making: High Complexitybased on patient assessment and decision making process of determining plan of care and establishing reasonable expectations for measurable functional outcomes    REQUIRES PT FOLLOW UP: Yes  Discharge Recommendations: Continue to assess pending progress, IP Rehab    Patient Education:  Patient Education: POC    Equipment Recommendations:  Equipment Needed:  (continue to assess)    Safety:  Type of devices:  All fall risk precautions in place, Call light within reach, Chair alarm in place, Gait belt, Patient at risk for falls    Plan:  Times per week: 7x T/O  Times per day: Daily  Specific instructions for Next Treatment: therex, mobility, pre gait activities  Current Treatment Recommendations: Strengthening, ROM, Balance Training, Functional Mobility Training, Transfer Training, Endurance Training, Wheelchair Mobility Training    Goals:  Patient goals : return home/ IP rehab  Short term goals  Time Frame for Short term goals: 1 week  Short term goal 1: Pt to sit <-> supine CGA to get in and out of bed  Short term goal 2: Pt to transfer sit <-> stand with RW min A to get in and out of bed/chairs  Short term goal 3: Pt to transfer with slideboard to [facility] with min A x2 to get out of bed  Short term goal 4: PT to assess amb when able    Evaluation Complexity: Based on the findings of patient history, examination, clinical presentation, and decision making during this evaluation, the evaluation of Jomar Velázquez  is of high complexity. PT G-Codes  Functional Assessment Tool Used: professional judgement  Functional Limitation: Mobility: Walking and moving around  Mobility: Walking and Moving Around Current Status (): At least 60 percent but less than 80 percent impaired, limited or restricted  Mobility: Walking and Moving Around Goal Status ():  At least 40 percent but less than 60 percent impaired, limited or restricted

## 2017-09-08 NOTE — PROGRESS NOTES
decreased in vision. Ent: Denies any tinnitus or vertigo. Resp: Denies any cough or shortness of breath. CV: Denies any syncope, palpitations or chest pain. GI:  Denies any abdominal pain, nausea, vomiting, constipation or diarrhea. : Denies any hematuria, hesitancy, or dysuria. Heme/Lymph: Denies any bleeding. Musculoskeletal: Denies numbness and tingling LUE and RLE   Neuro: Denies any dizziness, paresthesia or weakness. OBJECTIVE    Patient Vitals for the past 24 hrs:   BP Temp Temp src Pulse Resp SpO2 Weight   09/08/17 1140 119/68 98.1 °F (36.7 °C) Oral 100 17 97 % -   09/08/17 0730 123/88 98.2 °F (36.8 °C) Oral 99 16 100 % -   09/08/17 0526 122/65 98.5 °F (36.9 °C) Oral 89 12 96 % 258 lb 8 oz (117.3 kg)   09/07/17 2346 109/69 98.3 °F (36.8 °C) Oral 114 16 95 % -   09/07/17 2100 120/69 98.8 °F (37.1 °C) Oral 105 16 97 % -   09/07/17 1710 106/62 98.8 °F (37.1 °C) Oral 101 21 97 % -   09/07/17 1230 115/63 98.6 °F (37 °C) Oral 118 22 98 % -     INTAKE/OUTPUT:    Intake/Output Summary (Last 24 hours) at 09/08/17 1223  Last data filed at 09/08/17 0529   Gross per 24 hour   Intake          1304.22 ml   Output              775 ml   Net           529.22 ml     I/O last 3 completed shifts: In: 1804.2 [P.O.:450; I.V.:1354.2]  Out: 1010 [Urine:990; Blood:20]    PHYSICAL EXAM:  General appearance:  Alert and oriented x 3. No apparent distress, appears stated age and cooperative. No apparent distress. HEENT:  Normal cephalic, atraumatic without obvious deformity. Pupils equal, round, and reactive to light. Conjunctivae/corneas clear. Neck: Supple, with full range of motion. No jugular venous distention. Trachea midline. Respiratory:  Normal respiratory effort. Clear to auscultation, bilaterally without Rales/Wheezes/Rhonchi. Cardiovascular:  Regular rate and rhythm. Abdomen: Soft, non-tender, non-distended with normal bowel sounds. Musculoskeletal:  No clubbing, cyanosis or edema bilaterally.   Full range
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
## 2017-09-08 NOTE — PROGRESS NOTES
[facility] OR    TOE SURGERY      TUBAL LIGATION             Subjective  Chart Reviewed: Yes (H&P, Rn notes, imaging, order)  Patient assessed for rehabilitation services?: Yes  Family / Caregiver Present: No    Subjective: Pleasant and cooperative, up in w/c    General:  Overall Orientation Status: Within Functional Limits    Vision: Impaired    Hearing: Within functional limits         Pain:  Pain Assessment  Patient Currently in Pain: Yes  Pain Assessment: 0-10  Pain Level: 7  Pain Type: Acute pain  Pain Location: Generalized       Social/Functional:  Lives With: Family  Type of Home: House  Home Layout: Two level, Performs ADL's on one level, Able to Live on Main level with bedroom/bathroom  Home Access: Stairs to enter with rails  Entrance Stairs - Number of Steps: 3  Home Equipment: Cane     Bathroom Shower/Tub: Tub/Shower unit, Walk-in shower  Bathroom Toilet: Standard  Bathroom Equipment:  (has a riser available)    Receives Help From: Family  ADL Assistance: Independent  Homemaking Assistance: Independent  Homemaking Responsibilities: Yes    Ambulation Assistance: Independent  Transfer Assistance: Independent            Objective        Overall Cognitive Status: Exceptions  Cognition Comment: slow processing, head injury                                     LUE AROM (degrees)  LUE AROM : Exceptions  LUE General AROM: MAIKEL distal ROM, proximal WFL          RUE AROM (degrees)  RUE AROM : WFL       LUE Strength  LUE Strength Comment: MAIKEL d/t multiple fxs                RUE Strength  RUE Strength Comment: MAIKEL d/t multiple fxs                      ADL  LE Dressing: Dependent/Total (socks)     Bed mobility  Sit to Supine: Moderate assistance  Scooting: Moderate assistance  Comment: 4 people present, but pt able complete majority of work    Transfers  Slide Board:  Moderate assistance (+3 w/c to bed, however, pt able to provide good amount of assistance)  Transfer Comments: may want to trial platform walker    Balance  Sitting Balance: Contact guard assistance (at EOB after t/f)  Standing Balance: Unable to assess(comment)     Time: did complete lift off from EOB with good tolerance and Nadia                 Activity Tolerance:  Activity Tolerance: Patient limited by fatigue, Patient Tolerated treatment well  Activity Tolerance: Treatment initiated: Pt completed slide board t/f as detailed above. Discussed continuum of care and possible DME needs. Discussed brain injury precautions. Educated student nurse on precautions. Assessment:  Assessment: Pt exhibiting above deficits requiring additional OT intervention to increase indep with self care. Performance deficits / Impairments: Decreased functional mobility , Decreased endurance, Decreased ADL status, Decreased balance, Decreased strength, Decreased high-level IADLs, Decreased cognition, Decreased safe awareness, Decreased ROM  Prognosis: Good  Discharge Recommendations: IP Rehab (Great IPR candidate)    Clinical Decision Making: Clinical Decision making was of High Complexity as the result of analysis of data from a comprehensive assessment, the presence of comorbidities affecting the plan of care and the need for signficant modifications or assistance required to complete the evaluation. Patient Education:  Patient Education: OT POC, TBI precautions, continuum of care    Equipment Recommendations:  Equipment Needed: Yes  Other: cont to assess needs    Safety:  Safety Devices in place: Yes  Type of devices:  All fall risk precautions in place, Left in bed, Patient at risk for falls, Nurse notified, Call light within reach, Gait belt    Plan:  Times per week: 7x  Current Treatment Recommendations: Strengthening, Balance Training, Functional Mobility Training, Endurance Training, Safety Education & Training, Self-Care / ADL, Patient/Caregiver Education & Training, Equipment Evaluation, Education, & procurement, Home Management Training    Goals:

## 2017-09-09 ENCOUNTER — APPOINTMENT (OUTPATIENT)
Dept: GENERAL RADIOLOGY | Age: 37
DRG: 912 | End: 2017-09-09
Payer: MEDICARE

## 2017-09-09 PROCEDURE — A6210 FOAM DRG >16<=48 SQ IN W/O B: HCPCS

## 2017-09-09 PROCEDURE — 6360000002 HC RX W HCPCS: Performed by: SURGERY

## 2017-09-09 PROCEDURE — 6370000000 HC RX 637 (ALT 250 FOR IP): Performed by: NURSE PRACTITIONER

## 2017-09-09 PROCEDURE — 99231 SBSQ HOSP IP/OBS SF/LOW 25: CPT | Performed by: SURGERY

## 2017-09-09 PROCEDURE — 94010 BREATHING CAPACITY TEST: CPT

## 2017-09-09 PROCEDURE — 97110 THERAPEUTIC EXERCISES: CPT

## 2017-09-09 PROCEDURE — 71010 XR CHEST PORTABLE: CPT

## 2017-09-09 PROCEDURE — 2580000003 HC RX 258: Performed by: SURGERY

## 2017-09-09 PROCEDURE — 2060000000 HC ICU INTERMEDIATE R&B

## 2017-09-09 PROCEDURE — 97530 THERAPEUTIC ACTIVITIES: CPT

## 2017-09-09 PROCEDURE — 6370000000 HC RX 637 (ALT 250 FOR IP): Performed by: SURGERY

## 2017-09-09 RX ADMIN — TIZANIDINE 4 MG: 4 TABLET ORAL at 08:31

## 2017-09-09 RX ADMIN — VENLAFAXINE 75 MG: 37.5 TABLET ORAL at 08:31

## 2017-09-09 RX ADMIN — TIZANIDINE 4 MG: 4 TABLET ORAL at 20:34

## 2017-09-09 RX ADMIN — Medication 10 ML: at 20:34

## 2017-09-09 RX ADMIN — HYDROMORPHONE HYDROCHLORIDE 1 MG: 1 INJECTION, SOLUTION INTRAMUSCULAR; INTRAVENOUS; SUBCUTANEOUS at 07:12

## 2017-09-09 RX ADMIN — DOCUSATE SODIUM 100 MG: 100 CAPSULE ORAL at 20:34

## 2017-09-09 RX ADMIN — DOCUSATE SODIUM 100 MG: 100 CAPSULE ORAL at 08:31

## 2017-09-09 RX ADMIN — FAMOTIDINE 20 MG: 20 TABLET, FILM COATED ORAL at 20:34

## 2017-09-09 RX ADMIN — HYDROMORPHONE HYDROCHLORIDE 1 MG: 1 INJECTION, SOLUTION INTRAMUSCULAR; INTRAVENOUS; SUBCUTANEOUS at 03:46

## 2017-09-09 RX ADMIN — KETOROLAC TROMETHAMINE 30 MG: 30 INJECTION, SOLUTION INTRAMUSCULAR at 15:28

## 2017-09-09 RX ADMIN — Medication 10 ML: at 08:32

## 2017-09-09 RX ADMIN — HYDROMORPHONE HYDROCHLORIDE 1 MG: 1 INJECTION, SOLUTION INTRAMUSCULAR; INTRAVENOUS; SUBCUTANEOUS at 13:16

## 2017-09-09 RX ADMIN — FAMOTIDINE 20 MG: 20 TABLET, FILM COATED ORAL at 08:31

## 2017-09-09 RX ADMIN — POLYETHYLENE GLYCOL 3350 17 G: 17 POWDER, FOR SOLUTION ORAL at 08:31

## 2017-09-09 RX ADMIN — HYDROCODONE BITARTRATE AND ACETAMINOPHEN 2 TABLET: 5; 325 TABLET ORAL at 19:13

## 2017-09-09 ASSESSMENT — PAIN SCALES - GENERAL
PAINLEVEL_OUTOF10: 7
PAINLEVEL_OUTOF10: 8
PAINLEVEL_OUTOF10: 7
PAINLEVEL_OUTOF10: 7
PAINLEVEL_OUTOF10: 8
PAINLEVEL_OUTOF10: 0
PAINLEVEL_OUTOF10: 10
PAINLEVEL_OUTOF10: 8
PAINLEVEL_OUTOF10: 8
PAINLEVEL_OUTOF10: 6
PAINLEVEL_OUTOF10: 0

## 2017-09-09 ASSESSMENT — PAIN DESCRIPTION - FREQUENCY
FREQUENCY: CONTINUOUS
FREQUENCY: CONTINUOUS

## 2017-09-09 ASSESSMENT — PAIN DESCRIPTION - ORIENTATION
ORIENTATION: RIGHT
ORIENTATION: RIGHT

## 2017-09-09 ASSESSMENT — PAIN DESCRIPTION - PAIN TYPE
TYPE: ACUTE PAIN
TYPE: ACUTE PAIN

## 2017-09-09 ASSESSMENT — PAIN DESCRIPTION - LOCATION
LOCATION: FOOT
LOCATION: CHEST

## 2017-09-09 NOTE — PROGRESS NOTES
Respiratory Care is following the rib fracture order set. Patient's position when testing was done was semi-fowlers. A Negative Inspiratory Force (NIF) was performed with patient achieving a NIF of >-40 cm H2O. The NIF was greater than 25 cm H2O. A Forced Vital Capacity (FVC) was obtained with patient achieving an FVC of 2.43 liters. The patient's calculated ideal body weight, (IBW) is  57.3 kg. 0.020 liters/kg of the patient's IBW is 1.145 liters. The patient's FVC was greater than 0.020 liters/kg of IBW. Based on the spirometry measurement alone, patient does not meet ICU admission criteria. Previous FVC was 2.17 liters and previous NIF was >-40 cm H2O. Patient's NIF and FVC are improving from previous screening(s). Last pain medication was given on  9/9/2017 @ 32 82 12. Physician was not called regarding spirometry measurement.

## 2017-09-09 NOTE — PROGRESS NOTES
Orthopaedic Progress Note      SUBJECTIVE   Ms. Severt is post op day # 2     Patient notes s/p left hand extensor tendon repair  Non operative treatment right tibial plateau fracture  M7-9 and C6 fractures treated by Ortho Spine  C/o left side pain       OBJECTIVE      Physical    VITALS:  /66  Pulse 82  Temp 98.2 °F (36.8 °C) (Oral)   Resp 16  Ht 5' 3\" (1.6 m)  Wt 266 lb 3.2 oz (120.7 kg)  SpO2 97%  BMI 47.16 kg/m2  I/O last 3 completed shifts: In: 8806 [P.O.:1310;  I.V.:5]  Out: 930 [Urine:930]      Skin LUE: bandages dry  Musc LUE: sensation intact      Data  CBC:   Lab Results   Component Value Date    WBC 6.0 09/08/2017    HGB 9.5 09/08/2017     09/08/2017     BMP:    Lab Results   Component Value Date     09/08/2017    K 4.0 09/08/2017     09/08/2017    CO2 25 09/08/2017    BUN 12 09/08/2017    CREATININE 0.5 09/08/2017    CALCIUM 8.7 09/08/2017    GLUCOSE 109 09/08/2017     Uric Acid:  No components found for: URIC  PT/INR:  No results found for: PROTIME, INR  Troponin:  No results found for: TROPONINI  Urine Culture:  No components found for: CURINE      Current Inpatient Medications    Current Facility-Administered Medications: tiZANidine (ZANAFLEX) tablet 4 mg, 4 mg, Oral, Q6H PRN  docusate sodium (COLACE) capsule 100 mg, 100 mg, Oral, BID  famotidine (PEPCID) tablet 20 mg, 20 mg, Oral, BID  HYDROcodone-acetaminophen (NORCO) 5-325 MG per tablet 1 tablet, 1 tablet, Oral, Q4H PRN  HYDROcodone-acetaminophen (NORCO) 5-325 MG per tablet 2 tablet, 2 tablet, Oral, Q4H PRN  hydrOXYzine (ATARAX) tablet 10 mg, 10 mg, Oral, Q6H PRN  lidocaine (LIDODERM) 5 % 3 patch, 3 patch, Transdermal, Daily  polyethylene glycol (GLYCOLAX) packet 17 g, 17 g, Oral, Daily  venlafaxine (EFFEXOR) tablet 75 mg, 75 mg, Oral, Daily  sodium chloride flush 0.9 % injection 10 mL, 10 mL, Intravenous, 2 times per day  sodium chloride flush 0.9 % injection 10 mL, 10 mL, Intravenous, PRN  acetaminophen (TYLENOL)
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
## 2017-09-09 NOTE — PROGRESS NOTES
within reach, Chair alarm in place, Gait belt, Patient at risk for falls    Plan:  Times per week: 7x T/O  Times per day: Daily  Specific instructions for Next Treatment: therex, mobility, pre gait activities  Current Treatment Recommendations: Strengthening, ROM, Balance Training, Functional Mobility Training, Transfer Training, Endurance Training, Wheelchair Mobility Training    Goals:  Patient goals : return home/ IP rehab    Short term goals  Time Frame for Short term goals: 1 week  Short term goal 1: Pt to sit <-> supine CGA to get in and out of bed  Short term goal 2: Pt to transfer sit <-> stand with RW min A to get in and out of bed/chairs  Short term goal 3: Pt to transfer with slideboard to [facility] with min A x2 to get out of bed  Short term goal 4: PT to assess amb when able         PT G-Codes  Functional Assessment Tool Used: professional judgement  Functional Limitation: Mobility: Walking and moving around  Mobility: Walking and Moving Around Current Status (): At least 60 percent but less than 80 percent impaired, limited or restricted  Mobility: Walking and Moving Around Goal Status ():  At least 40 percent but less than 60 percent impaired, limited or restricted

## 2017-09-10 ENCOUNTER — APPOINTMENT (OUTPATIENT)
Dept: GENERAL RADIOLOGY | Age: 37
DRG: 912 | End: 2017-09-10
Payer: MEDICARE

## 2017-09-10 PROCEDURE — 99233 SBSQ HOSP IP/OBS HIGH 50: CPT | Performed by: SURGERY

## 2017-09-10 PROCEDURE — 2580000003 HC RX 258: Performed by: SURGERY

## 2017-09-10 PROCEDURE — 71010 XR CHEST PORTABLE: CPT

## 2017-09-10 PROCEDURE — 94010 BREATHING CAPACITY TEST: CPT

## 2017-09-10 PROCEDURE — 2060000000 HC ICU INTERMEDIATE R&B

## 2017-09-10 PROCEDURE — 97530 THERAPEUTIC ACTIVITIES: CPT

## 2017-09-10 PROCEDURE — 6370000000 HC RX 637 (ALT 250 FOR IP): Performed by: NURSE PRACTITIONER

## 2017-09-10 PROCEDURE — 97110 THERAPEUTIC EXERCISES: CPT

## 2017-09-10 PROCEDURE — 6370000000 HC RX 637 (ALT 250 FOR IP): Performed by: SURGERY

## 2017-09-10 RX ADMIN — HYDROCODONE BITARTRATE AND ACETAMINOPHEN 2 TABLET: 5; 325 TABLET ORAL at 00:16

## 2017-09-10 RX ADMIN — HYDROCODONE BITARTRATE AND ACETAMINOPHEN 2 TABLET: 5; 325 TABLET ORAL at 04:45

## 2017-09-10 RX ADMIN — POLYETHYLENE GLYCOL 3350 17 G: 17 POWDER, FOR SOLUTION ORAL at 10:43

## 2017-09-10 RX ADMIN — HYDROXYZINE HYDROCHLORIDE 10 MG: 10 TABLET ORAL at 23:14

## 2017-09-10 RX ADMIN — HYDROCODONE BITARTRATE AND ACETAMINOPHEN 2 TABLET: 5; 325 TABLET ORAL at 15:04

## 2017-09-10 RX ADMIN — HYDROCODONE BITARTRATE AND ACETAMINOPHEN 2 TABLET: 5; 325 TABLET ORAL at 19:42

## 2017-09-10 RX ADMIN — Medication 10 ML: at 10:43

## 2017-09-10 RX ADMIN — HYDROCODONE BITARTRATE AND ACETAMINOPHEN 2 TABLET: 5; 325 TABLET ORAL at 23:49

## 2017-09-10 RX ADMIN — FAMOTIDINE 20 MG: 20 TABLET, FILM COATED ORAL at 10:43

## 2017-09-10 RX ADMIN — TIZANIDINE 4 MG: 4 TABLET ORAL at 19:45

## 2017-09-10 RX ADMIN — Medication 10 ML: at 19:45

## 2017-09-10 RX ADMIN — VENLAFAXINE 75 MG: 37.5 TABLET ORAL at 10:43

## 2017-09-10 RX ADMIN — FAMOTIDINE 20 MG: 20 TABLET, FILM COATED ORAL at 19:45

## 2017-09-10 RX ADMIN — DOCUSATE SODIUM 100 MG: 100 CAPSULE ORAL at 19:45

## 2017-09-10 RX ADMIN — TIZANIDINE 4 MG: 4 TABLET ORAL at 04:45

## 2017-09-10 ASSESSMENT — PAIN DESCRIPTION - ORIENTATION: ORIENTATION: RIGHT

## 2017-09-10 ASSESSMENT — PAIN SCALES - GENERAL
PAINLEVEL_OUTOF10: 7
PAINLEVEL_OUTOF10: 8
PAINLEVEL_OUTOF10: 8
PAINLEVEL_OUTOF10: 9
PAINLEVEL_OUTOF10: 8
PAINLEVEL_OUTOF10: 8
PAINLEVEL_OUTOF10: 7
PAINLEVEL_OUTOF10: 8
PAINLEVEL_OUTOF10: 5

## 2017-09-10 ASSESSMENT — PAIN DESCRIPTION - LOCATION
LOCATION: RIB CAGE

## 2017-09-10 ASSESSMENT — PAIN DESCRIPTION - PAIN TYPE
TYPE: ACUTE PAIN

## 2017-09-10 ASSESSMENT — PAIN DESCRIPTION - FREQUENCY: FREQUENCY: CONTINUOUS

## 2017-09-10 NOTE — PROGRESS NOTES
[facility]  Daily Progress Note  Pt Name: Ladonna Lambert  Medical Record Number: [id number]  Date of Birth 1980   Today's Date: 9/9/2017        CHIEF [facility]    SUBJECTIVE  Patient stable  Non wt bearing r leg    OBJECTIVE  CURRENT VITALS /72  Pulse 82  Temp 97.4 °F (36.3 °C) (Oral)   Resp 16  Ht 5' 3\" (1.6 m)  Wt 266 lb 3.2 oz (120.7 kg)  SpO2 98%  BMI 47.16 kg/m2  LUNGS: Lungs clear   ABDOMEN: soft BS +brace c-collar in place  WOUNDS: abrsions improved  24 HR INTAKE/OUTPUT :   Intake/Output Summary (Last 24 hours) at 09/09/17 2322  Last data filed at 09/09/17 1330   Gross per 24 hour   Intake              790 ml   Output              630 ml   Net              160 ml   I/O last 3 completed shifts: In: 790 [P.O.:790]  Out: 630 [Urine:630]  DRAIN/TUBE OUTPUT :      LABS  CBC :   Lab Results   Component Value Date    WBC 6.0 09/08/2017    HGB 9.5 09/08/2017    HCT 28.3 09/08/2017     09/08/2017     BMP: Lab Results   Component Value Date     09/08/2017    K 4.0 09/08/2017     09/08/2017    CO2 25 09/08/2017    BUN 12 09/08/2017    CREATININE 0.5 09/08/2017       ASSESSMENT  1. Stable       PLAN  1.  Cont rx      Antione Clarke  Electronically signed 9/9/2017 at 11:22 PM
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
## 2017-09-10 NOTE — PLAN OF CARE
Problem: Pain:  Goal: Pain level will decrease  Pain level will decrease   Outcome: Ongoing  Patient complaining of left side pain. Pain goal 6. Goal: Control of acute pain  Control of acute pain   Outcome: Ongoing  Patient complaining of left side pain. Improved from yesterday. Patient stating only minimal pain that is controlled with lidoderm patches. Problem: Falls - Risk of  Goal: Absence of falls  Outcome: Ongoing  Patient up as tolerated with much assistance as she has limited mobility with her injuries. Using call light appropriately. Alert and oriented X 4. Problem: Risk for Impaired Skin Integrity  Goal: Tissue integrity - skin and mucous membranes  Structural intactness and normal physiological function of skin and  mucous membranes. Outcome: Ongoing  Multiple abrasions and bruising on skin. Keeping clean and dry. Problem: Urinary Elimination:  Goal: Complications related to the disease process, condition or treatment will be avoided or minimized  Complications related to the disease process, condition or treatment will be avoided or minimized   Outcome: Ongoing  Patent cervantes catheter remains in place. Draining clear yellow urine. Problem: Musculor/Skeletal Functional Status  Goal: Highest potential functional level  Outcome: Ongoing  Able to transfer from bed to chair and back with help them nurse or therapy. Comments:   Care plan reviewed with patient. Patient verbalize understanding of the plan of care and contribute to goal setting.
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
## 2017-09-11 ENCOUNTER — APPOINTMENT (OUTPATIENT)
Dept: GENERAL RADIOLOGY | Age: 37
DRG: 912 | End: 2017-09-11
Payer: MEDICARE

## 2017-09-11 ENCOUNTER — HOSPITAL ENCOUNTER (INPATIENT)
Age: 37
LOS: 15 days | Discharge: HOME HEALTH | DRG: 912 | End: 2017-09-26
Attending: PHYSICAL MEDICINE & REHABILITATION | Admitting: PHYSICAL MEDICINE & REHABILITATION
Payer: MEDICARE

## 2017-09-11 VITALS
SYSTOLIC BLOOD PRESSURE: 112 MMHG | WEIGHT: 268.7 LBS | OXYGEN SATURATION: 99 % | DIASTOLIC BLOOD PRESSURE: 69 MMHG | HEIGHT: 63 IN | TEMPERATURE: 98.1 F | HEART RATE: 95 BPM | BODY MASS INDEX: 47.61 KG/M2 | RESPIRATION RATE: 17 BRPM

## 2017-09-11 LAB
ANION GAP SERPL CALCULATED.3IONS-SCNC: 12 MEQ/L (ref 8–16)
BUN BLDV-MCNC: 12 MG/DL (ref 7–22)
CALCIUM SERPL-MCNC: 9.1 MG/DL (ref 8.5–10.5)
CHLORIDE BLD-SCNC: 100 MEQ/L (ref 98–111)
CO2: 26 MEQ/L (ref 23–33)
CREAT SERPL-MCNC: 0.7 MG/DL (ref 0.4–1.2)
GFR SERPL CREATININE-BSD FRML MDRD: > 90 ML/MIN/1.73M2
GLUCOSE BLD-MCNC: 116 MG/DL (ref 70–108)
HCT VFR BLD CALC: 30 % (ref 37–47)
HEMOGLOBIN: 10.2 GM/DL (ref 12–16)
MCH RBC QN AUTO: 30.4 PG (ref 27–31)
MCHC RBC AUTO-ENTMCNC: 33.9 GM/DL (ref 33–37)
MCV RBC AUTO: 89.4 FL (ref 81–99)
PDW BLD-RTO: 14.5 % (ref 11.5–14.5)
PLATELET # BLD: 231 THOU/MM3 (ref 130–400)
PMV BLD AUTO: 8.2 MCM (ref 7.4–10.4)
POTASSIUM SERPL-SCNC: 4.1 MEQ/L (ref 3.5–5.2)
RBC # BLD: 3.35 MILL/MM3 (ref 4.2–5.4)
SODIUM BLD-SCNC: 138 MEQ/L (ref 135–145)
WBC # BLD: 4.7 THOU/MM3 (ref 4.8–10.8)

## 2017-09-11 PROCEDURE — 6370000000 HC RX 637 (ALT 250 FOR IP): Performed by: NURSE PRACTITIONER

## 2017-09-11 PROCEDURE — 97530 THERAPEUTIC ACTIVITIES: CPT

## 2017-09-11 PROCEDURE — 97110 THERAPEUTIC EXERCISES: CPT

## 2017-09-11 PROCEDURE — 80048 BASIC METABOLIC PNL TOTAL CA: CPT

## 2017-09-11 PROCEDURE — 36415 COLL VENOUS BLD VENIPUNCTURE: CPT

## 2017-09-11 PROCEDURE — 99222 1ST HOSP IP/OBS MODERATE 55: CPT | Performed by: PHYSICAL MEDICINE & REHABILITATION

## 2017-09-11 PROCEDURE — 6370000000 HC RX 637 (ALT 250 FOR IP): Performed by: SURGERY

## 2017-09-11 PROCEDURE — 71010 XR CHEST PORTABLE: CPT

## 2017-09-11 PROCEDURE — 85027 COMPLETE CBC AUTOMATED: CPT

## 2017-09-11 PROCEDURE — 99999 PR OFFICE/OUTPT VISIT,PROCEDURE ONLY: CPT | Performed by: NURSE PRACTITIONER

## 2017-09-11 PROCEDURE — 2580000003 HC RX 258: Performed by: SURGERY

## 2017-09-11 PROCEDURE — 97112 NEUROMUSCULAR REEDUCATION: CPT

## 2017-09-11 PROCEDURE — 94010 BREATHING CAPACITY TEST: CPT

## 2017-09-11 PROCEDURE — 1180000000 HC REHAB R&B

## 2017-09-11 PROCEDURE — 6370000000 HC RX 637 (ALT 250 FOR IP): Performed by: PHYSICAL MEDICINE & REHABILITATION

## 2017-09-11 PROCEDURE — 99233 SBSQ HOSP IP/OBS HIGH 50: CPT | Performed by: SURGERY

## 2017-09-11 RX ORDER — POLYETHYLENE GLYCOL 3350 17 G/17G
17 POWDER, FOR SOLUTION ORAL 2 TIMES DAILY
Status: CANCELLED | OUTPATIENT
Start: 2017-09-11

## 2017-09-11 RX ORDER — POLYETHYLENE GLYCOL 3350 17 G/17G
17 POWDER, FOR SOLUTION ORAL 2 TIMES DAILY
Status: DISCONTINUED | OUTPATIENT
Start: 2017-09-11 | End: 2017-09-11 | Stop reason: HOSPADM

## 2017-09-11 RX ORDER — HYDROCODONE BITARTRATE AND ACETAMINOPHEN 5; 325 MG/1; MG/1
1 TABLET ORAL EVERY 4 HOURS PRN
Status: DISCONTINUED | OUTPATIENT
Start: 2017-09-11 | End: 2017-09-26 | Stop reason: HOSPADM

## 2017-09-11 RX ORDER — HYDROCODONE BITARTRATE AND ACETAMINOPHEN 5; 325 MG/1; MG/1
2 TABLET ORAL EVERY 4 HOURS PRN
Status: CANCELLED | OUTPATIENT
Start: 2017-09-11

## 2017-09-11 RX ORDER — LIDOCAINE 50 MG/G
3 PATCH TOPICAL EVERY 24 HOURS
Status: DISCONTINUED | OUTPATIENT
Start: 2017-09-11 | End: 2017-09-26 | Stop reason: HOSPADM

## 2017-09-11 RX ORDER — TIZANIDINE 4 MG/1
4 TABLET ORAL EVERY 6 HOURS PRN
Status: CANCELLED | OUTPATIENT
Start: 2017-09-11

## 2017-09-11 RX ORDER — ONDANSETRON 4 MG/1
4 TABLET, FILM COATED ORAL EVERY 8 HOURS PRN
Status: DISCONTINUED | OUTPATIENT
Start: 2017-09-11 | End: 2017-09-26 | Stop reason: HOSPADM

## 2017-09-11 RX ORDER — TRAZODONE HYDROCHLORIDE 50 MG/1
50 TABLET ORAL NIGHTLY
Status: CANCELLED | OUTPATIENT
Start: 2017-09-11

## 2017-09-11 RX ORDER — DOCUSATE SODIUM 100 MG/1
100 CAPSULE, LIQUID FILLED ORAL 2 TIMES DAILY
Status: DISCONTINUED | OUTPATIENT
Start: 2017-09-11 | End: 2017-09-26 | Stop reason: HOSPADM

## 2017-09-11 RX ORDER — FAMOTIDINE 20 MG/1
20 TABLET, FILM COATED ORAL 2 TIMES DAILY
Status: CANCELLED | OUTPATIENT
Start: 2017-09-11

## 2017-09-11 RX ORDER — VENLAFAXINE 37.5 MG/1
75 TABLET ORAL DAILY
Status: CANCELLED | OUTPATIENT
Start: 2017-09-12

## 2017-09-11 RX ORDER — SENNA PLUS 8.6 MG/1
2 TABLET ORAL NIGHTLY
Status: DISCONTINUED | OUTPATIENT
Start: 2017-09-11 | End: 2017-09-26 | Stop reason: HOSPADM

## 2017-09-11 RX ORDER — SENNA PLUS 8.6 MG/1
2 TABLET ORAL NIGHTLY
Status: CANCELLED | OUTPATIENT
Start: 2017-09-11

## 2017-09-11 RX ORDER — FAMOTIDINE 20 MG/1
20 TABLET, FILM COATED ORAL 2 TIMES DAILY
Status: DISCONTINUED | OUTPATIENT
Start: 2017-09-11 | End: 2017-09-26 | Stop reason: HOSPADM

## 2017-09-11 RX ORDER — ACETAMINOPHEN 325 MG/1
650 TABLET ORAL EVERY 4 HOURS PRN
Status: DISCONTINUED | OUTPATIENT
Start: 2017-09-11 | End: 2017-09-26 | Stop reason: HOSPADM

## 2017-09-11 RX ORDER — ONDANSETRON 4 MG/1
4 TABLET, FILM COATED ORAL EVERY 8 HOURS PRN
Status: CANCELLED | OUTPATIENT
Start: 2017-09-11

## 2017-09-11 RX ORDER — HYDROCODONE BITARTRATE AND ACETAMINOPHEN 5; 325 MG/1; MG/1
2 TABLET ORAL EVERY 4 HOURS PRN
Status: DISCONTINUED | OUTPATIENT
Start: 2017-09-11 | End: 2017-09-26 | Stop reason: HOSPADM

## 2017-09-11 RX ORDER — HYDROCODONE BITARTRATE AND ACETAMINOPHEN 5; 325 MG/1; MG/1
1 TABLET ORAL EVERY 4 HOURS PRN
Status: CANCELLED | OUTPATIENT
Start: 2017-09-11

## 2017-09-11 RX ORDER — DOCUSATE SODIUM 100 MG/1
100 CAPSULE, LIQUID FILLED ORAL 2 TIMES DAILY
Status: CANCELLED | OUTPATIENT
Start: 2017-09-11

## 2017-09-11 RX ORDER — TRAZODONE HYDROCHLORIDE 50 MG/1
50 TABLET ORAL NIGHTLY
Status: DISCONTINUED | OUTPATIENT
Start: 2017-09-11 | End: 2017-09-26 | Stop reason: HOSPADM

## 2017-09-11 RX ORDER — HYDROXYZINE HYDROCHLORIDE 10 MG/1
10 TABLET, FILM COATED ORAL EVERY 6 HOURS PRN
Status: DISCONTINUED | OUTPATIENT
Start: 2017-09-11 | End: 2017-09-26 | Stop reason: HOSPADM

## 2017-09-11 RX ORDER — LIDOCAINE 50 MG/G
3 PATCH TOPICAL DAILY
Status: CANCELLED | OUTPATIENT
Start: 2017-09-11

## 2017-09-11 RX ORDER — HYDROXYZINE HYDROCHLORIDE 10 MG/1
10 TABLET, FILM COATED ORAL EVERY 6 HOURS PRN
Status: CANCELLED | OUTPATIENT
Start: 2017-09-11

## 2017-09-11 RX ORDER — SENNA PLUS 8.6 MG/1
2 TABLET ORAL NIGHTLY
Status: DISCONTINUED | OUTPATIENT
Start: 2017-09-11 | End: 2017-09-11 | Stop reason: HOSPADM

## 2017-09-11 RX ORDER — TIZANIDINE 4 MG/1
4 TABLET ORAL EVERY 6 HOURS PRN
Status: DISCONTINUED | OUTPATIENT
Start: 2017-09-11 | End: 2017-09-26 | Stop reason: HOSPADM

## 2017-09-11 RX ORDER — ACETAMINOPHEN 325 MG/1
650 TABLET ORAL EVERY 4 HOURS PRN
Status: CANCELLED | OUTPATIENT
Start: 2017-09-11

## 2017-09-11 RX ORDER — DOCUSATE SODIUM 100 MG/1
100 CAPSULE, LIQUID FILLED ORAL 2 TIMES DAILY
Status: DISCONTINUED | OUTPATIENT
Start: 2017-09-11 | End: 2017-09-11 | Stop reason: SDUPTHER

## 2017-09-11 RX ORDER — POLYETHYLENE GLYCOL 3350 17 G/17G
17 POWDER, FOR SOLUTION ORAL 2 TIMES DAILY
Status: DISCONTINUED | OUTPATIENT
Start: 2017-09-11 | End: 2017-09-26 | Stop reason: HOSPADM

## 2017-09-11 RX ORDER — VENLAFAXINE 37.5 MG/1
75 TABLET ORAL DAILY
Status: DISCONTINUED | OUTPATIENT
Start: 2017-09-12 | End: 2017-09-26 | Stop reason: HOSPADM

## 2017-09-11 RX ADMIN — Medication 10 ML: at 09:18

## 2017-09-11 RX ADMIN — VENLAFAXINE 75 MG: 37.5 TABLET ORAL at 09:18

## 2017-09-11 RX ADMIN — HYDROCODONE BITARTRATE AND ACETAMINOPHEN 2 TABLET: 5; 325 TABLET ORAL at 21:32

## 2017-09-11 RX ADMIN — TIZANIDINE 4 MG: 4 TABLET ORAL at 06:58

## 2017-09-11 RX ADMIN — SENNA 17.2 MG: 8.6 TABLET, COATED ORAL at 23:21

## 2017-09-11 RX ADMIN — HYDROCODONE BITARTRATE AND ACETAMINOPHEN 2 TABLET: 5; 325 TABLET ORAL at 06:57

## 2017-09-11 RX ADMIN — FAMOTIDINE 20 MG: 20 TABLET, FILM COATED ORAL at 09:18

## 2017-09-11 RX ADMIN — DOCUSATE SODIUM 100 MG: 100 CAPSULE ORAL at 09:18

## 2017-09-11 RX ADMIN — ACETAMINOPHEN 650 MG: 325 TABLET ORAL at 09:18

## 2017-09-11 RX ADMIN — DOCUSATE SODIUM 100 MG: 100 CAPSULE ORAL at 23:22

## 2017-09-11 RX ADMIN — TIZANIDINE 4 MG: 4 TABLET ORAL at 01:21

## 2017-09-11 RX ADMIN — HYDROCODONE BITARTRATE AND ACETAMINOPHEN 2 TABLET: 5; 325 TABLET ORAL at 12:11

## 2017-09-11 RX ADMIN — POLYETHYLENE GLYCOL 3350 17 G: 17 POWDER, FOR SOLUTION ORAL at 09:18

## 2017-09-11 RX ADMIN — TRAZODONE HYDROCHLORIDE 50 MG: 50 TABLET ORAL at 23:22

## 2017-09-11 RX ADMIN — POLYETHYLENE GLYCOL 3350 17 G: 17 POWDER, FOR SOLUTION ORAL at 23:21

## 2017-09-11 RX ADMIN — FAMOTIDINE 20 MG: 20 TABLET, FILM COATED ORAL at 23:22

## 2017-09-11 ASSESSMENT — PAIN SCALES - GENERAL
PAINLEVEL_OUTOF10: 9
PAINLEVEL_OUTOF10: 8
PAINLEVEL_OUTOF10: 9
PAINLEVEL_OUTOF10: 6
PAINLEVEL_OUTOF10: 7
PAINLEVEL_OUTOF10: 5
PAINLEVEL_OUTOF10: 9
PAINLEVEL_OUTOF10: 7

## 2017-09-11 ASSESSMENT — PAIN DESCRIPTION - LOCATION
LOCATION: RIB CAGE;NECK
LOCATION: RIB CAGE
LOCATION: RIB CAGE

## 2017-09-11 ASSESSMENT — PAIN DESCRIPTION - PAIN TYPE
TYPE: ACUTE PAIN
TYPE: SURGICAL PAIN;ACUTE PAIN
TYPE: ACUTE PAIN

## 2017-09-11 ASSESSMENT — PAIN DESCRIPTION - ORIENTATION
ORIENTATION: RIGHT
ORIENTATION: LEFT

## 2017-09-11 ASSESSMENT — PAIN DESCRIPTION - DESCRIPTORS: DESCRIPTORS: SHARP;THROBBING;TENDER

## 2017-09-11 ASSESSMENT — PAIN DESCRIPTION - ONSET: ONSET: ON-GOING

## 2017-09-11 ASSESSMENT — PAIN DESCRIPTION - FREQUENCY: FREQUENCY: CONTINUOUS
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
## 2017-09-11 NOTE — IP AVS SNAPSHOT
how often you take this medication has CHANGED        Last Dose    Next Dose Due AM NOON PM NIGHT    HYDROcodone-acetaminophen 5-325 MG per tablet   Commonly known as:  NORCO   Take 1 tablet by mouth every 4 hours as needed for Pain . What changed:  how much to take                2 tablets on 9/26/2017  8:18 AM                              These are medications you told us you were taking at home, CONTINUE taking them after you leave the hospital        Last Dose    Next Dose Due AM NOON PM NIGHT    venlafaxine 75 MG tablet   Commonly known as:  EFFEXOR   Take 75 mg by mouth daily                75 mg on 9/26/2017  8:18 AM                                 Where to Get Your Medications      These medications were sent to [address] [place] [address], [address] [place] [address]     Phone:  [phone]     docusate 100 MG Caps    HYDROcodone-acetaminophen 5-325 MG per tablet    Lidocaine 4 % Gel    polyethylene glycol packet    senna 8.6 MG tablet    tiZANidine 4 MG tablet    traZODone 50 MG tablet         You can get these medications from any pharmacy     You don't need a prescription for these medications     acetaminophen 325 MG tablet               Allergies as of 9/26/2017        Reactions    Codeine     Morphine     Percocet [Oxycodone-acetaminophen]       Immunizations as of 9/26/2017     No immunizations on file. Last Vitals          Most Recent Value    Temp  97.9 °F (36.6 °C)    Pulse  82    Resp  16    BP  110/74         After Visit Summary    This summary was created for you. Thank you for entrusting your care to us.   The following information includes details about your hospital/visit stay along with steps you should take to help with your recovery once you leave the hospital.  In this packet, you will find information about the topics listed below:    · Instructions about your medications including a list of your home medications  · A summary of your hospital visit  · Follow-up appointments once you have left the hospital  · Your care plan at home      You may receive a survey regarding the care you received during your stay. Your input is valuable to us. We encourage you to complete and return your survey in the envelope provided. We hope you will choose us in the future for your healthcare needs. Patient Information     Patient Name PRIYA Charlton [place] 1980      Care Provided at:     Name Address Phone       [address] [phone]            Your Visit    Here you will find information about your visit, including the reason for your visit. Please take this sheet with you when you visit your doctor or other health care provider in the future. It will help determine the best possible medical care for you at that time. If you have any questions once you leave the hospital, please call the department phone number listed below. Why you were here     Your primary diagnosis was:  Tbi (Traumatic Brain Injury) ([facility]ly [facility])      Visit Information     Date & Time Provider Department Dept. Phone    2017 Alejandro Espinosa, [address] [phone]       Follow-up Appointments    Below is a list of your follow-up and future appointments. This may not be a complete list as you may have made appointments directly with providers that we are not aware of or your providers may have made some for you. Please call your providers to confirm appointments. It is important to keep your appointments. Please bring your current insurance card, photo ID, co-pay, and all medication bottles to your appointment. If self-pay, payment is expected at the time of service. Follow-up Information     Follow up with Vi Farley MD On 10/11/2017.     Specialty:  Orthopedic Surgery    Why:  @ 9:10 a.m     Contact information: [address]  [phone]          Follow up with Ej Davis MD On 10/2/2017. Specialty:  General Practice    Why:  @ 2:00 p.m. Contact information:    [address]  [phone]          Follow up with Karen Lopez MD On 10/25/2017. Specialty:  Orthopedic Surgery    Why:  @ 10:30 am    Contact information:    [address]  [phone]          Follow up with Faith Zendejas CNP On 10/19/2017. Specialty:  Nurse Practitioner    Why:  @ 9:30 a.m     Contact information:    [address]  [place] [facility]  [phone]          Follow up with Pérez Robert, PhD.    Specialty:  Psychology    Why:  Call if needed     Contact information:    [address] [facility]  [address] [address]  [phone]        Future Appointments     10/19/2017 9:30 AM     Appointment with Olaf Jarrett NP at [address] ([phone])   Please arrive 15 minutes prior to appointment time, bring insurance card and photo ID.    [address] [address]  [facility] [address]         Preventive Care        Date Due    HIV screening is recommended for all people regardless of risk factors  aged 15-65 years at least once (lifetime) who have never been HIV tested. 1/14/1997    Tetanus Combination Vaccine (1 - Tdap) 1/14/2001    Pneumococcal Vaccine - Pneumovax for adults aged 19-64 years with: chronic heart disease, chronic lung disease, diabetes mellitus, alcoholism, chronic liver disease, or cigarette smoking. (1 of 1 - PPSV23) 1/14/2001    Pap Smear 1/14/2003    Yearly Flu Vaccine (1) 9/1/2017                 Care Plan Once You Return Home    This section includes instructions you will need to follow once you leave the hospital.  Your care team will discuss these with you, so you and those caring for you know how to best care for your health needs at home. This section may also include educational information about certain health topics that may be of help to you. Discharge Instructions       Discharge Instructions     Patient Instructions:   Home  Therapy orders: PT and OT   Discharge lab work: none  Code status: Full Code   Activity: activity as tolerated and no driving while on analgesics  Diet: DIET GENERAL;  Dietary Nutrition Supplements: Standard High Calorie Oral Supplement    Wound Care: keep wound clean and dry and as directed  Equipment: wheelchair and walker     · NWB RLE with knee immobilizer  · NWB left hand   · Cervical brace should be removed/replaced in supine position. Change pads on c-collar when patient is in supine position. · ACE wrap right leg for swelling  ·  Procare universal Foam wrist/forearm splint Left  Can be obtained from Operating Room  · Knee immobilizer right knee         Important information for a smoker       SMOKING: QUIT SMOKING. THIS IS THE MOST IMPORTANT ACTION YOU CAN TAKE TO IMPROVE YOUR CURRENT AND FUTURE HEALTH. Call the [address] [place] [address] at [facility] ([phone])    Smoking harms nonsmokers. When nonsmokers are around people who smoke, they absorb nicotine, carbon monoxide, and other ingredients of tobacco smoke. DO NOT SMOKE AROUND CHILDREN     Children exposed to secondhand smoke are at an increased risk of:  Sudden Infant Death Syndrome (SIDS), acute respiratory infections, inflammation of the middle ear, and severe asthma. Over a longer time, it causes heart disease and lung cancer. There is no safe level of exposure to secondhand smoke. [employer] Signup     Our records indicate that you have an active [employer] account. You can view your After Visit Summary by going to [email or web address] and logging in with your [employer] username and password.       If you don't have a [employer] username and password but a parent or

## 2017-09-11 NOTE — PROGRESS NOTES
Social/Functional:  Lives With: Family  Type of Home: House  Home Layout: Two level, Performs ADL's on one level, Able to Live on Main level with bedroom/bathroom  Home Access: Stairs to enter with rails  Entrance Stairs - Number of Steps: 3  Home Equipment: Cane     Objective:  Rolling to Left: Stand by assistance  Rolling to Right: Stand by assistance  Supine to Sit: Minimal assistance  Sit to Supine: Unable to assess (Pt left sitting up in bedside chair. )    Transfers  Lateral Transfers: Contact guard assistance (x1 with slideboard. )    Balance  Comments: Static sit at EOB ~8 min while preparing for transfer. Pt required stand by assist. Notes feeling dizzy and nauseous upon first sitting up. Exercises:  Exercises  Comments: Seated in WC pt completed BLE ankle pumps, L LE LAQ, marches, hip abd/add, B glut set all x10 reps to increase strength for improved functional mobility. Activity Tolerance:  Activity Tolerance: Patient limited by pain    Assessment: Body structures, Functions, Activity limitations: Decreased functional mobility , Decreased ROM, Decreased strength, Decreased endurance, Decreased balance  Assessment: Pt very pleasant and motivated. Limited by pain and non weight bearing status of L UE and R LE. Would benefit from further therapy services before returning to home. Prognosis: Good  REQUIRES PT FOLLOW UP: Yes  Discharge Recommendations: Continue to assess pending progress, IP Rehab    Patient Education:  Patient Education: slide board transfer. POC. Equipment Recommendations:  Equipment Needed:  (continue to assess)    Safety:  Type of devices:  All fall risk precautions in place, Call light within reach, Chair alarm in place, Gait belt, Patient at risk for falls    Plan:  Times per week: 7x T/O  Times per day: Daily  Specific instructions for Next Treatment: therex, mobility, pre gait activities  Current Treatment Recommendations: Strengthening, ROM, Balance Training, Functional Mobility Training, Transfer Training, Endurance Training, Wheelchair Mobility Training    Goals:  Patient goals : return home/ IP rehab    Short term goals  Time Frame for Short term goals: 1 week  Short term goal 1: Pt to sit <-> supine CGA to get in and out of bed  Short term goal 2: Pt to transfer sit <-> stand with RW min A to get in and out of bed/chairs  Short term goal 3: Pt to transfer with slideboard to [facility] with min A x2 to get out of bed  Short term goal 4: PT to assess amb when able         PT G-Codes  Functional Assessment Tool Used: professional judgement  Functional Limitation: Mobility: Walking and moving around  Mobility: Walking and Moving Around Current Status (): At least 60 percent but less than 80 percent impaired, limited or restricted  Mobility: Walking and Moving Around Goal Status ():  At least 40 percent but less than 60 percent impaired, limited or restricted

## 2017-09-11 NOTE — PROGRESS NOTES
Respiratory Care is following the rib fracture order set. Patient's position when testing was done was sitting. A Negative Inspiratory Force (NIF) was performed with patient achieving a NIF of -40 cm H2O. The NIF was greater than 25 cm H2O. A Forced Vital Capacity (FVC) was obtained with patient achieving an FVC of 2.59 liters. The patient's calculated ideal body weight, (IBW) is  57.3 kg. 0.020 liters/kg of the patient's IBW is 1.145 liters. The patient's FVC was greater than 0.020 liters/kg of IBW. Based on the spirometry measurement alone, patient does not meet ICU admission criteria. Previous FVC was 2.66 liters and previous NIF was -40 cm H2O. Patient's NIF and FVC show no change from previous screening(s). Last pain medication was given on  9-11-17 @ 0918. Physician was not called regarding spirometry measurement. Pt reaching [id number] ml on Inc Spir.

## 2017-09-11 NOTE — PROGRESS NOTES
Report was given to this nurse on patient, 4K nurse stated patient had chest xray that showed pneumonia in left base today, this nurse called Carolina DUNN to let know of CXR and Carolina DUNN and Dr. Estela Britt do not want to treat pneumonia at this time, instead monitor and encourage incentive spirometry.

## 2017-09-11 NOTE — LETTER
[address] [facility] [address]  Phone: [phone]    Ranjit Swanson      September 26, 2017     Patient: Ida Sabillon   YOB: 1980   Date of Visit: 9/11/2017       To Whom It May Concern: It is my medical opinion that Ida Sabillon requires a disability parking placard for the following reasons:  She cannot walk without assistance from another person or the use of an assistance device (cane, crutch, prosthetic device, wheelchair, etc.). She has limited walking ability due to an orthopedic condition. Duration of need: 4 months    If you have any questions or concerns, please don't hesitate to call.     Sincerely,        Aguila Castillo CNP

## 2017-09-12 LAB
ANION GAP SERPL CALCULATED.3IONS-SCNC: 9 MEQ/L (ref 8–16)
BUN BLDV-MCNC: 12 MG/DL (ref 7–22)
CALCIUM SERPL-MCNC: 9.3 MG/DL (ref 8.5–10.5)
CHLORIDE BLD-SCNC: 101 MEQ/L (ref 98–111)
CO2: 29 MEQ/L (ref 23–33)
CREAT SERPL-MCNC: 0.5 MG/DL (ref 0.4–1.2)
GFR SERPL CREATININE-BSD FRML MDRD: > 90 ML/MIN/1.73M2
GLUCOSE BLD-MCNC: 107 MG/DL (ref 70–108)
HCT VFR BLD CALC: 29.6 % (ref 37–47)
HEMOGLOBIN: 10.1 GM/DL (ref 12–16)
MCH RBC QN AUTO: 30.5 PG (ref 27–31)
MCHC RBC AUTO-ENTMCNC: 34.2 GM/DL (ref 33–37)
MCV RBC AUTO: 89.1 FL (ref 81–99)
PDW BLD-RTO: 14.4 % (ref 11.5–14.5)
PLATELET # BLD: 223 THOU/MM3 (ref 130–400)
PMV BLD AUTO: 7.7 MCM (ref 7.4–10.4)
POTASSIUM SERPL-SCNC: 4.2 MEQ/L (ref 3.5–5.2)
RBC # BLD: 3.33 MILL/MM3 (ref 4.2–5.4)
SODIUM BLD-SCNC: 139 MEQ/L (ref 135–145)
WBC # BLD: 4.6 THOU/MM3 (ref 4.8–10.8)

## 2017-09-12 PROCEDURE — 97166 OT EVAL MOD COMPLEX 45 MIN: CPT

## 2017-09-12 PROCEDURE — 51798 US URINE CAPACITY MEASURE: CPT

## 2017-09-12 PROCEDURE — 80048 BASIC METABOLIC PNL TOTAL CA: CPT

## 2017-09-12 PROCEDURE — 97530 THERAPEUTIC ACTIVITIES: CPT

## 2017-09-12 PROCEDURE — 6370000000 HC RX 637 (ALT 250 FOR IP): Performed by: PHYSICAL MEDICINE & REHABILITATION

## 2017-09-12 PROCEDURE — 97116 GAIT TRAINING THERAPY: CPT

## 2017-09-12 PROCEDURE — 6370000000 HC RX 637 (ALT 250 FOR IP): Performed by: NURSE PRACTITIONER

## 2017-09-12 PROCEDURE — 6360000002 HC RX W HCPCS: Performed by: NURSE PRACTITIONER

## 2017-09-12 PROCEDURE — 97110 THERAPEUTIC EXERCISES: CPT

## 2017-09-12 PROCEDURE — 85027 COMPLETE CBC AUTOMATED: CPT

## 2017-09-12 PROCEDURE — 96125 COGNITIVE TEST BY HC PRO: CPT

## 2017-09-12 PROCEDURE — 99232 SBSQ HOSP IP/OBS MODERATE 35: CPT | Performed by: PHYSICAL MEDICINE & REHABILITATION

## 2017-09-12 PROCEDURE — 6360000002 HC RX W HCPCS: Performed by: PHYSICAL MEDICINE & REHABILITATION

## 2017-09-12 PROCEDURE — 1180000000 HC REHAB R&B

## 2017-09-12 PROCEDURE — 97163 PT EVAL HIGH COMPLEX 45 MIN: CPT

## 2017-09-12 PROCEDURE — 97542 WHEELCHAIR MNGMENT TRAINING: CPT

## 2017-09-12 PROCEDURE — 36415 COLL VENOUS BLD VENIPUNCTURE: CPT

## 2017-09-12 PROCEDURE — 97535 SELF CARE MNGMENT TRAINING: CPT

## 2017-09-12 RX ADMIN — ONDANSETRON 4 MG: 4 TABLET, FILM COATED ORAL at 07:45

## 2017-09-12 RX ADMIN — HYDROCODONE BITARTRATE AND ACETAMINOPHEN 2 TABLET: 5; 325 TABLET ORAL at 08:45

## 2017-09-12 RX ADMIN — HYDROCODONE BITARTRATE AND ACETAMINOPHEN 2 TABLET: 5; 325 TABLET ORAL at 22:25

## 2017-09-12 RX ADMIN — HYDROCODONE BITARTRATE AND ACETAMINOPHEN 2 TABLET: 5; 325 TABLET ORAL at 04:31

## 2017-09-12 RX ADMIN — DOCUSATE SODIUM 100 MG: 100 CAPSULE ORAL at 08:45

## 2017-09-12 RX ADMIN — FAMOTIDINE 20 MG: 20 TABLET, FILM COATED ORAL at 22:25

## 2017-09-12 RX ADMIN — HYDROCODONE BITARTRATE AND ACETAMINOPHEN 2 TABLET: 5; 325 TABLET ORAL at 13:23

## 2017-09-12 RX ADMIN — POLYETHYLENE GLYCOL 3350 17 G: 17 POWDER, FOR SOLUTION ORAL at 08:45

## 2017-09-12 RX ADMIN — TRAZODONE HYDROCHLORIDE 50 MG: 50 TABLET ORAL at 22:25

## 2017-09-12 RX ADMIN — VENLAFAXINE 75 MG: 37.5 TABLET ORAL at 08:45

## 2017-09-12 RX ADMIN — ENOXAPARIN SODIUM 40 MG: 40 INJECTION SUBCUTANEOUS at 08:45

## 2017-09-12 RX ADMIN — FAMOTIDINE 20 MG: 20 TABLET, FILM COATED ORAL at 08:45

## 2017-09-12 ASSESSMENT — PAIN DESCRIPTION - DESCRIPTORS
DESCRIPTORS: SHARP;TENDER
DESCRIPTORS: SHARP
DESCRIPTORS: SHARP;TENDER

## 2017-09-12 ASSESSMENT — PAIN DESCRIPTION - LOCATION
LOCATION: RIB CAGE

## 2017-09-12 ASSESSMENT — PAIN SCALES - GENERAL
PAINLEVEL_OUTOF10: 9
PAINLEVEL_OUTOF10: 7
PAINLEVEL_OUTOF10: 9
PAINLEVEL_OUTOF10: 9
PAINLEVEL_OUTOF10: 4
PAINLEVEL_OUTOF10: 9
PAINLEVEL_OUTOF10: 5
PAINLEVEL_OUTOF10: 8
PAINLEVEL_OUTOF10: 6
PAINLEVEL_OUTOF10: 8
PAINLEVEL_OUTOF10: 9
PAINLEVEL_OUTOF10: 4
PAINLEVEL_OUTOF10: 9

## 2017-09-12 ASSESSMENT — PAIN DESCRIPTION - ORIENTATION
ORIENTATION: LEFT

## 2017-09-12 ASSESSMENT — PAIN DESCRIPTION - PAIN TYPE
TYPE: SURGICAL PAIN;ACUTE PAIN
TYPE: ACUTE PAIN;SURGICAL PAIN
TYPE: ACUTE PAIN;SURGICAL PAIN

## 2017-09-12 ASSESSMENT — PAIN DESCRIPTION - ONSET: ONSET: ON-GOING

## 2017-09-12 ASSESSMENT — PAIN DESCRIPTION - FREQUENCY: FREQUENCY: CONTINUOUS
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
## 2017-09-12 NOTE — PLAN OF CARE
Problem: DISCHARGE BARRIERS  Goal: Patients continuum of care needs are met  Intervention: INVOLVE PATIENT/S.O. IN DISCHARGE PLANNING PROCESS  SW met with physician to discuss need for TBI protocol. Physician does not feel patient requires restrictions for rehab stay at this time. Nursing and therapy staff updated accordingly. SW to follow and maintain involvement in discharge planning.

## 2017-09-12 NOTE — PLAN OF CARE
Individualized Plan of Care  [address] [address] Inpatient Rehabilitation Unit    Rehabilitation physician: Dr. Freda Parham Date: 9/11/2017     Rehabilitation Diagnosis: TBI (traumatic brain injury) ([facility] [address] [S06.9X9A]  TBI (traumatic brain injury), with LOC of 30 min or less, initial encounter [E08.9X2R]     Rehabilitation impairments: self care, mobility, motor dysfunction, bowel/bladder management, pain management, safety and cognitive function    Factors facilitating achievement of predicted outcomes: Family support, Caregiver support, Friend support, Motivated, Cooperative, Pleasant, Sense of humor and Good insight into deficits  Barriers to the achievement of predicted outcomes: Pain, Cognitive deficit, Limited participation, Decreased endurance, Upper extremity weakness, Lower extremity weakness, Medical complications, Stairs at home, Skin Care, Wound Care and Medication managment    Patient Goals: Improve independence with mobility, Improvement of mobility at a wheelchair level, Increase overall strength and endurance, Increase balance, Increase independence with activities of daily living, Improve cognition, Increase self-awareness, Increase safety awareness, Integrate appropriate pain management plan, Assure adequate nutritional option for discharge, Continence of bowel and bladder and Provide appropriate patient and family education      NURSING:  Nursing goals for [facility] while on the rehabilitation unit will include:  Continence of bowel and bladder, Adequate number of bowel movements, Urinate with no urinary retention >300ml in bladder, Complete bladder protocol with cervantes removal, Maintain O2 SATs at an acceptable level during stay, Effective pain management while on the rehabilitation unit, Establish adequate pain control plan for discharge, Absence of skin breakdown while on the rehabilitation unit, Improved skin integrity via assessments including wound measurements, Avoidance of any Adali Soriano MD
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
## 2017-09-12 NOTE — PROGRESS NOTES
cognition, Decreased safe awareness, Decreased ROM  Prognosis: Good  Discharge Recommendations: Patient would benefit from continued therapy after discharge, Continue to assess pending progress    Clinical Decision Making: Clinical Decision making was of Moderate Complexity as the result of analysis of data from a detailed assessment, a consideration of several treatment options, the presence of comorbidities affecting the plan of care and the need for minimal to moderate modifications or assistance required to complete the evaluation. Patient Education:  Patient Education: OT POC, safe transfers,     Equipment Recommendations: Other: May need to consider a drop arm BSC, shower chair. Safety:  Safety Devices in place: Yes  Type of devices: Call light within reach, Gait belt, All fall risk precautions in place, Patient at risk for falls, Nurse notified, Bed alarm in place, Left in bed    Plan:  Times per week: 5xs week x 90 min , 1 x week x 30 min  Current Treatment Recommendations: Strengthening, Balance Training, Functional Mobility Training, Endurance Training, Safety Education & Training, Self-Care / ADL, Patient/Caregiver Education & Training, Equipment Evaluation, Education, & procurement, Home Management Training, Wheelchair Mobility Training    Goals:  Patient goals : Be able to care for myself as much as possible.      Short term goals  Time Frame for Short term goals: 1 week  Short term goal 1: Pt will complete ADL routine with no > min cues for NWB status L hand, R LE to increase independence in self care tasks   Short term goal 2: PT will complete basic transfers with CGA and no > min cues for safe setup and technique to increase independence in toileting routine  Short term goal 3: Pt will complete standing tasks x 2 min with CGA and no cues for NWB status to increase independence in toileting tasks  Short term goal 4: Pt will complete basic homemaking tasks with no > Supervision to increase independence in childcare tasks   Long term goals  Time Frame for Long term goals : 2-3 weeks  Long term goal 1: Pt will complete ADL routine with no cues for safe and adapted tech to increase independence in self care tasks  Long term goal 2: Pt will complete 2 step homemaking tasks with no cues for safety, attention to task and adapted tech to increase independence in simple cooking tasks    Evaluation Complexity: Based on the findings of patient history, examination, clinical presentation, and decision making during this evaluation, this patient is of medium complexity.

## 2017-09-12 NOTE — PROGRESS NOTES
facilitating achievement of predicted outcomes: Family support and Motivated  Barriers to the achievement of predicted outcomes: Cognitive deficit and Decreased endurance    Team Members Present at Conference:  : Flynn Jones [facility]   Occupational Therapist: Guero Little OTR/L [id number]. Physical Therapist: Richard Ann PT, DPT [id number]  Speech Therapist: Mary Rowe [address], [address] Nurse: Gumaro Schmidt, RN, BSN   Psychologist: Virginia Lord, PhD.    I approve the established interdisciplinary plan of care as documented within the medical record of [facility].     [facility]

## 2017-09-12 NOTE — PROGRESS NOTES
Physical Medicine & Rehabilitation   Progress Note    Chief Complaint:  Trauma, TBI, rehab needs    Subjective:  Patient is doing well today. Had some minor bleeding at cervantes removal.  She is otherwise tolerating therapy. Pain controlled. Slept better on trazodone. Rehabilitation:  Physical therapy: FIMS:  Bed Mobility:      Transfers: Sit to Stand: Minimal Assistance (maintained NWB RLE with cues ea trial.  From bed and w/c.)  Stand to sit: Contact guard assistance  Bed to Chair: Moderate assistance (partial stand pivot performed with no devices. Difficulty maintaining NWB RLE)  Stand Pivot Transfers: Minimal Assistance (with pf RW. Demo given for technique. Used a combination of hops and pivoting on Lt foot.), Ambulation 1  Surface: level tile  Device: Rolling Walker, Platform Crutches left  Other Apparatus: Knee Immobilizer, Right  Assistance: Minimal assistance  Quality of Gait: small steps. decreased endurance. Distance: 3 ft,      FIMS: Bed, Chair, Wheel Chair: 2 - Requires 50-74% assistance to transfer  Walk: 1- Total Assistance (Subject less than 25%)  Distance Walked: 3 ft  Wheel Chair: 1- Total Assistance (Subject less than 25%)  Distance Traveled in Wheel Chair: 25 ft,  , Assessment: Pt very pleasant and motivated. Limited by pain and non weight bearing status of L UE and R LE. Would benefit from further therapy services to progress her functional mobility skills    Occupational therapy: FIMS:  Eatin - Feeds self with setup/supervision/cues and/or requires only setup/supervision/cues to perform tube feedings  Groomin - Able to perform 3-4 tasks with touching help  Bathin - Able to bathe 3-4 areas (assist for R UE, tiffanie area, R LE, L foot. )  Dressing-Upper: 2 - Requires assist with 3 tasks  Dressing-Lower: 1 - Total assist with lower body dressing (assist for pants and socks over feet.  Mod A to pull pants over hips. )  Toiletin - Total assist  Toilet Transfer: 2 - Requires

## 2017-09-13 PROCEDURE — 99233 SBSQ HOSP IP/OBS HIGH 50: CPT | Performed by: PHYSICAL MEDICINE & REHABILITATION

## 2017-09-13 PROCEDURE — 97530 THERAPEUTIC ACTIVITIES: CPT

## 2017-09-13 PROCEDURE — 97116 GAIT TRAINING THERAPY: CPT

## 2017-09-13 PROCEDURE — 1180000000 HC REHAB R&B

## 2017-09-13 PROCEDURE — 97542 WHEELCHAIR MNGMENT TRAINING: CPT

## 2017-09-13 PROCEDURE — 6370000000 HC RX 637 (ALT 250 FOR IP): Performed by: NURSE PRACTITIONER

## 2017-09-13 PROCEDURE — 6370000000 HC RX 637 (ALT 250 FOR IP): Performed by: PHYSICAL MEDICINE & REHABILITATION

## 2017-09-13 PROCEDURE — 97535 SELF CARE MNGMENT TRAINING: CPT

## 2017-09-13 PROCEDURE — 97532 HC COGNITIVE THERAPY 15 MIN: CPT

## 2017-09-13 PROCEDURE — 97110 THERAPEUTIC EXERCISES: CPT

## 2017-09-13 PROCEDURE — 6360000002 HC RX W HCPCS: Performed by: PHYSICAL MEDICINE & REHABILITATION

## 2017-09-13 RX ADMIN — DOCUSATE SODIUM 100 MG: 100 CAPSULE ORAL at 21:04

## 2017-09-13 RX ADMIN — FAMOTIDINE 20 MG: 20 TABLET, FILM COATED ORAL at 21:05

## 2017-09-13 RX ADMIN — HYDROCODONE BITARTRATE AND ACETAMINOPHEN 2 TABLET: 5; 325 TABLET ORAL at 21:05

## 2017-09-13 RX ADMIN — SENNA 17.2 MG: 8.6 TABLET, COATED ORAL at 21:05

## 2017-09-13 RX ADMIN — VENLAFAXINE 75 MG: 37.5 TABLET ORAL at 07:44

## 2017-09-13 RX ADMIN — TRAZODONE HYDROCHLORIDE 50 MG: 50 TABLET ORAL at 21:04

## 2017-09-13 RX ADMIN — FAMOTIDINE 20 MG: 20 TABLET, FILM COATED ORAL at 07:44

## 2017-09-13 RX ADMIN — POLYETHYLENE GLYCOL 3350 17 G: 17 POWDER, FOR SOLUTION ORAL at 21:06

## 2017-09-13 RX ADMIN — HYDROCODONE BITARTRATE AND ACETAMINOPHEN 2 TABLET: 5; 325 TABLET ORAL at 07:44

## 2017-09-13 RX ADMIN — HYDROCODONE BITARTRATE AND ACETAMINOPHEN 1 TABLET: 5; 325 TABLET ORAL at 12:39

## 2017-09-13 RX ADMIN — ENOXAPARIN SODIUM 40 MG: 40 INJECTION SUBCUTANEOUS at 07:44

## 2017-09-13 ASSESSMENT — PAIN SCALES - GENERAL
PAINLEVEL_OUTOF10: 8
PAINLEVEL_OUTOF10: 6
PAINLEVEL_OUTOF10: 4
PAINLEVEL_OUTOF10: 8
PAINLEVEL_OUTOF10: 5
PAINLEVEL_OUTOF10: 4
PAINLEVEL_OUTOF10: 5

## 2017-09-13 ASSESSMENT — PAIN DESCRIPTION - PAIN TYPE
TYPE: ACUTE PAIN

## 2017-09-13 ASSESSMENT — PAIN DESCRIPTION - ORIENTATION
ORIENTATION: LEFT
ORIENTATION: LEFT

## 2017-09-13 ASSESSMENT — PAIN DESCRIPTION - LOCATION
LOCATION: RIB CAGE

## 2017-09-13 ASSESSMENT — PAIN DESCRIPTION - DESCRIPTORS
DESCRIPTORS: TENDER
DESCRIPTORS: ACHING;SHARP

## 2017-09-13 NOTE — PROGRESS NOTES
(Recall + Reasoning)=   Sum SS= 245  SS= 125  Percentile Ranking= 95  **Borderline Normal     SCATBI TOTAL=   Sum SS= 588  SS= 126  Percentile Rank= 96   **Borderline Normal     IMPRESSIONS: Pt with borderline-normal cognitive functioning evidenced by testing results/scores noted above in all areas of assessment including lower and higher level functioning. Would recommend trial therapy x2-3 sessions in order to further address high level attention, complex recall/organization and reasoning in order to further improve skills and determine further need for follow up pending pt progression throughout therapy sessions. SHORT TERM GOAL #2: NEW GOAL:   Goal 2: .  Pt will complete complex executive functioning, organization and reasoning tasks with 80% accuracy, no cues to maximize mental flexibility and complex sequential skills. INTERVENTIONS: Goal initiated. SHORT TERM GOAL #3: NEW GOAL:   Goal 3: .  Pt will complete complex alternating/divided and multimodal attention tasks with no more than x2-3 errors/redirections within a 2 minute task to improve complex attention for return to completion of complex IADL tasks at discharge (i.e. Driving, cooking, etc). INTERVENTIONS: Goal initiated.      Communication  Comprehension: 6 - Complex ideas 90% or device (hearing aid/glasses)  Expression: 6 - Device used to express complex ideas/needs  Social Cognition  Social Interaction: 6 - Patient requires medication for mood and/or effect  Problem Solvin - Independent with device (e.g. notes, schedules)  Memory: 6 - Patient requires device to recall (e.g. memory book)    ASSESSMENT:  Assessment: [x]Progressing towards goals          []Not Progressing towards goals       Patient Tolerance of Treatment:   [x]Tolerated well []Tolerated fair []Required rest breaks []Fatigued  Plan for Next Session: Explain results of session, TBI information, initiation of complex recall/attention tasks   Education:  Learner:  [x]Patient
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
## 2017-09-13 NOTE — PLAN OF CARE
Problem: Falls - Risk of:  Goal: Will remain free from falls  Will remain free from falls   Outcome: Ongoing  No falls this shift. Falling star program and alarms in use. Patient uses call light appropriately. Problem: Pain Control  Goal: Maintain pain level at or below patients acceptable level (or 5 if patient is unable to determine acceptable level)  Outcome: Ongoing  Patient with complaints of pain. PRN medications given per patient request. Encouraging ice and repositioning for better pain control. Problem: Anxiety:  Goal: Level of anxiety will decrease  Level of anxiety will decrease   Outcome: Ongoing  Patient calm and cooperative this shift. Encouraging calm deep breathing if feeling anxious. Problem: Activity:  Goal: Sleeping patterns will improve  Sleeping patterns will improve   Outcome: Ongoing  Patient states she is sleeping well with nightly Trazodone. Problem: Skin Integrity:  Goal: Will show no infection signs and symptoms  Will show no infection signs and symptoms   Outcome: Met This Shift  Afebrile. Patient shows no signs or symptoms of infection. Goal: Absence of new skin breakdown  Absence of new skin breakdown   Outcome: Ongoing  Skin assessed every shift. Encouraging repositioning for pressure ulcer prevention. Waffle cushion to chair. Problem: Bleeding:  Goal: Will show no signs and symptoms of excessive bleeding  Will show no signs and symptoms of excessive bleeding   Outcome: Ongoing  Patient on Lovenox injections. No active bleeding noted. Problem: Risk for Impaired Skin Integrity  Goal: Tissue integrity - skin and mucous membranes  Structural intactness and normal physiological function of skin and  mucous membranes. Outcome: Ongoing  Skin assessed every shift. Encouraging repositioning for pressure ulcer prevention. Waffle cushion to chair. Comments:   Care plan reviewed with patient.   Patient verbalizes understanding of the plan of care and contributes to goal setting.
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
## 2017-09-13 NOTE — PROGRESS NOTES
Past Surgical History:   Procedure Laterality Date     SECTION      CHOLECYSTECTOMY      FRACTURE SURGERY      right foot    MO REPAIR EXTEN TENDON,DORSUM HAND,EA Left 2017    LEFT HAND TENDON REPAIR INDEX, MIDDLE, & RING FINGERS performed by Singh Winston MD at [address] [facility]             Subjective    Pt motivated for therapies and agreeable to trial dry shower transfer using tub transfer bench. Pain:  Pain Assessment  Patient Currently in Pain: Yes  Pain Level: 4  Pain Type: Acute pain  Pain Location: Rib cage       Objective  Cognition: WNL for tasks         Transfers  Stand Pivot Transfers: Contact guard assistance  Sit to stand: Contact guard assistance  Stand to sit: Contact guard assistance  Tub Transfers  Tub - Transfer From:  (Platform walker )  Tub - Transfer To: Transfer tub bench  Tub - Technique: Ambulating  Tub Transfers: Contact guard  Tub Transfers Comments: Shower set up to simulate patient's set up at home. Pt ambulated 3 feet to and from tub transfer bench. Pt states she thinks she can borrow from family members. Balance  Sitting Balance: Supervision  Standing Balance: Contact guard assistance     Functional Mobility  Functional - Mobility Device: Platform walker  Assist Level: Contact guard assistance 3 feet    Functional Mobility Comments: Patient propelled w/c from her room to/from shower with SBA using LLE and RUE.       Activity Tolerance:  Activity Tolerance: Patient Tolerated treatment well    Assessment:     Performance deficits / Impairments: Decreased functional mobility , Decreased endurance, Decreased ADL status, Decreased balance, Decreased strength, Decreased high-level IADLs, Decreased cognition, Decreased safe awareness, Decreased ROM  Prognosis: Good  Discharge Recommendations: Patient would benefit from continued therapy after discharge, Continue to assess pending progress    Patient Education:  Patient Education:

## 2017-09-14 PROCEDURE — 97116 GAIT TRAINING THERAPY: CPT

## 2017-09-14 PROCEDURE — 6370000000 HC RX 637 (ALT 250 FOR IP): Performed by: NURSE PRACTITIONER

## 2017-09-14 PROCEDURE — 97530 THERAPEUTIC ACTIVITIES: CPT

## 2017-09-14 PROCEDURE — 97532 HC COGNITIVE THERAPY 15 MIN: CPT

## 2017-09-14 PROCEDURE — 97542 WHEELCHAIR MNGMENT TRAINING: CPT

## 2017-09-14 PROCEDURE — 97110 THERAPEUTIC EXERCISES: CPT

## 2017-09-14 PROCEDURE — 6360000002 HC RX W HCPCS: Performed by: NURSE PRACTITIONER

## 2017-09-14 PROCEDURE — 1180000000 HC REHAB R&B

## 2017-09-14 PROCEDURE — 99232 SBSQ HOSP IP/OBS MODERATE 35: CPT | Performed by: NURSE PRACTITIONER

## 2017-09-14 PROCEDURE — 6360000002 HC RX W HCPCS: Performed by: PHYSICAL MEDICINE & REHABILITATION

## 2017-09-14 PROCEDURE — 6370000000 HC RX 637 (ALT 250 FOR IP): Performed by: PHYSICAL MEDICINE & REHABILITATION

## 2017-09-14 RX ADMIN — DOCUSATE SODIUM 100 MG: 100 CAPSULE ORAL at 08:02

## 2017-09-14 RX ADMIN — ONDANSETRON 4 MG: 4 TABLET, FILM COATED ORAL at 08:08

## 2017-09-14 RX ADMIN — POLYETHYLENE GLYCOL 3350 17 G: 17 POWDER, FOR SOLUTION ORAL at 20:28

## 2017-09-14 RX ADMIN — HYDROCODONE BITARTRATE AND ACETAMINOPHEN 2 TABLET: 5; 325 TABLET ORAL at 21:17

## 2017-09-14 RX ADMIN — HYDROCODONE BITARTRATE AND ACETAMINOPHEN 2 TABLET: 5; 325 TABLET ORAL at 08:02

## 2017-09-14 RX ADMIN — VENLAFAXINE 75 MG: 37.5 TABLET ORAL at 08:02

## 2017-09-14 RX ADMIN — TRAZODONE HYDROCHLORIDE 50 MG: 50 TABLET ORAL at 20:29

## 2017-09-14 RX ADMIN — FAMOTIDINE 20 MG: 20 TABLET, FILM COATED ORAL at 20:28

## 2017-09-14 RX ADMIN — ENOXAPARIN SODIUM 40 MG: 40 INJECTION SUBCUTANEOUS at 08:08

## 2017-09-14 RX ADMIN — FAMOTIDINE 20 MG: 20 TABLET, FILM COATED ORAL at 08:02

## 2017-09-14 ASSESSMENT — PAIN DESCRIPTION - PAIN TYPE
TYPE: ACUTE PAIN

## 2017-09-14 ASSESSMENT — PAIN SCALES - GENERAL
PAINLEVEL_OUTOF10: 6
PAINLEVEL_OUTOF10: 6
PAINLEVEL_OUTOF10: 9
PAINLEVEL_OUTOF10: 6
PAINLEVEL_OUTOF10: 0
PAINLEVEL_OUTOF10: 7
PAINLEVEL_OUTOF10: 8
PAINLEVEL_OUTOF10: 9
PAINLEVEL_OUTOF10: 5

## 2017-09-14 ASSESSMENT — PAIN DESCRIPTION - ORIENTATION
ORIENTATION: LEFT

## 2017-09-14 ASSESSMENT — PAIN DESCRIPTION - FREQUENCY
FREQUENCY: CONTINUOUS
FREQUENCY: INTERMITTENT

## 2017-09-14 ASSESSMENT — PAIN DESCRIPTION - LOCATION
LOCATION: RIB CAGE
LOCATION: RIB CAGE;GENERALIZED
LOCATION: RIB CAGE

## 2017-09-14 ASSESSMENT — PAIN DESCRIPTION - DESCRIPTORS
DESCRIPTORS: ACHING

## 2017-09-14 ASSESSMENT — PAIN DESCRIPTION - PROGRESSION: CLINICAL_PROGRESSION: NOT CHANGED

## 2017-09-14 NOTE — PROGRESS NOTES
Past Surgical History:   Procedure Laterality Date     SECTION      CHOLECYSTECTOMY      FRACTURE SURGERY      right foot    AL REPAIR EXTEN TENDON,DORSUM HAND,EA Left 2017    LEFT HAND TENDON REPAIR INDEX, MIDDLE, & RING FINGERS performed by Jn Coates MD at [address] [facility]             Subjective       Subjective: Pt seated in wheelchair. Pleasant and motivated for therapies     Pain:  Pain Assessment  Patient Currently in Pain: Yes  Pain Level: 6  Pain Type: Acute pain       Objective  Overall Cognitive Status: WNL    Transfers  Sit to stand: Contact guard assistance  Stand to sit: Contact guard assistance  Transfer Comments: Able to maintain NWBing RLE     Balance  Sitting Balance: Supervision  Standing Balance: Contact guard assistance   Pt tolerated standing 2 trials of 1 UE release task, tolerating standing 6 minutes 10 secs and 5 minutes. Task was graded to involve 1 UE reaching task within base of support. Functional Mobility  Functional - Mobility Device: Platform walker  Assist Level: Contact guard assistance 2 steps forward and backward     Functional Mobility Comments: Patient propelled w/c from rehab gym to room with SBA using LLE and RUE. Shoe donned with total A and pt able to propel self better. Apparatus Needs: Knee Immobilizer;Right     Comment: 2# with RUE and AROM of LUE.   Pt completed 2 sets of the following:   Exercises  Shoulder Elevation: x10 reps  Shoulder Flexion: x10 reps  Horizontal ABduction: x10 reps  Other: Chest press x10 reps      Activity Tolerance:  Activity Tolerance: Patient Tolerated treatment well    Assessment:     Performance deficits / Impairments: Decreased functional mobility , Decreased endurance, Decreased ADL status, Decreased balance, Decreased strength, Decreased high-level IADLs, Decreased cognition, Decreased safe awareness, Decreased ROM  Prognosis: Good  Discharge Recommendations: Patient would benefit
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
## 2017-09-14 NOTE — PLAN OF CARE
Problem: DISCHARGE BARRIERS  Goal: Patients continuum of care needs are met  Intervention: INVOLVE PATIENT/S.O. IN DISCHARGE PLANNING PROCESS  Team Conference held this morning. Recommendations of the team were explained to the patient by MONA Sarmiento, and Dr. Nichole Miles. SW additionally met with patient and mother, Jamie Padron, to provide team conference update. Team is recommending that patient continue on acute inpatient rehab, with expected discharge date of to be determined by progress. Await further progress to determine discharge needs and discussion at team conference on Wednesday, 09/20/2017. Care plan reviewed with patient and mother, Jamie Padron. Patient and mother, Jamie Padron, verbalized understanding of the plan of care and contributed to goal setting. SW to follow and maintain involvement in discharge planning.

## 2017-09-14 NOTE — PROGRESS NOTES
[address] [address]  INPATIENT SPEECH THERAPY  [facility]    TIME   SLP Individual Minutes  Time In: 4114   Time Out: 1100   Minutes: 30   Timed Code Treatment Minutes: 30 Minutes       [x]Daily Note  []Progress Note  []Discharge Note    Date: 2017  Patient Name: Tiff Pritchett        MRN: [id number]    : 1980  (30 y.o.)  Gender: female   Primary Provider: Carlee Garcia MD  Admitting Diagnosis: TBI  Secondary Diagnosis: High level cognitive deficits   Precautions: Fall risk   Swallowing Status/Diet: Regular and thin   Swallowing Strategies: Standard   DATE of last MBS:  N/a     Subjective: Pt upright in wheelchair. Highly pleasant and cooperative. Review of testing results from SCAT-BI (Scales of Cognitive Ability for Traumatic Brain Injury) completed this date with recommendations for further follow up x2-3 sessions to address high level attention, complex recall and complex reasoning/organization. Pt agreeable and very receptive to feedback provided. SHORT TERM GOAL #1:  Goal 1: Pt will complete complex recall (prospective, working, etc) with 80% accuracy, no cues to improve retention of newly learned complex medical information. INTERVENTIONS: Recall of functional appointment containing 5 units of information:   Immediate recall:  indep   25 minute delay:  indep     Working recall via stating 4 words in alphabetical order (verbal presentation)  Immediate recall:  indep   Working manipulation:  indep,  self correction     SHORT TERM GOAL #2: NEW GOAL:   Goal 2: .  Pt will complete complex executive functioning, organization and reasoning tasks with 80% accuracy, no cues to maximize mental flexibility and complex sequential skills.    INTERVENTIONS: Deductive reasoning to determine order of events for [address] trip-- 100% accuracy indep with timely completion of task   *pt able to complete in < 10 minutes    SHORT TERM GOAL #3:   Goal 3: .  Pt will complete complex

## 2017-09-14 NOTE — PROGRESS NOTES
use at discharge)  Entrance Stairs - Number of Steps: 3  Entrance Stairs - Rails: None  Home Equipment: Rolling walker     Objective:  Supine to Sit: Minimal assistance (from flat mat going to left side, needed assistance at trunk)  Sit to Supine: Contact guard assistance (to bring Dg LEs onto flat mat, went into long sitting position prior to laying down, CGA to lower onto pillows)  Scooting: Stand by assistance    Transfers  Sit to Stand: Contact guard assistance (from WC, mat and bed)  Stand to sit: Contact guard assistance (to WC, mat and bed)  Stand Pivot Transfers: Contact guard assistance (Patient completed 2 stand pivot transfers (WC <-> bed), CGA for safety but did not required any physical assist, maintained NWB on LUE and RLE)      Ambulation 1  Surface: level tile  Device: Rolling Walker;Platform Walker left  Other Apparatus: Knee Immobilizer;Right  Assistance: Contact guard assistance;Minimal assistance  Quality of Gait: Smaller hops forward, slight decreased foot clearance towards end, mild dificulty with turning walker due to NWB on LUE, needed min. A occasionally with this, faitgued quickly. Distance: 6 ft x2      Balance  Comments: Standing at platform RW, patient completed dynamic standing balance of reaching above head to rings and then tossing them onto target out in front; able to perform 2 trials without needing a seated RB,  stood for ~3 minutes before needing to sit down to rest.  Left elbow support on walker and also balancing on RLE, mild unsteadiness but no LOB. Propulsion 1  Propulsion: Manual  Level: Ramp  Method: RUE;LLE  Level of Assistance: Supervision;Contact guard assistance  Description/ Details: Slow propulsion velocity, using RUE and LLE to assist in maneuvering, able to turn either direction without assistance, able to navigate self through tight spaces in room. Demonstrates good safety awareness. Did need CGA when descending ramp for safety.   Ascended ramp with retro

## 2017-09-15 PROCEDURE — 97530 THERAPEUTIC ACTIVITIES: CPT

## 2017-09-15 PROCEDURE — 97542 WHEELCHAIR MNGMENT TRAINING: CPT

## 2017-09-15 PROCEDURE — 97110 THERAPEUTIC EXERCISES: CPT

## 2017-09-15 PROCEDURE — 97535 SELF CARE MNGMENT TRAINING: CPT

## 2017-09-15 PROCEDURE — 6370000000 HC RX 637 (ALT 250 FOR IP): Performed by: NURSE PRACTITIONER

## 2017-09-15 PROCEDURE — 1180000000 HC REHAB R&B

## 2017-09-15 PROCEDURE — 6360000002 HC RX W HCPCS: Performed by: PHYSICAL MEDICINE & REHABILITATION

## 2017-09-15 PROCEDURE — 97532 HC COGNITIVE THERAPY 15 MIN: CPT

## 2017-09-15 PROCEDURE — 6370000000 HC RX 637 (ALT 250 FOR IP): Performed by: PHYSICAL MEDICINE & REHABILITATION

## 2017-09-15 PROCEDURE — 97116 GAIT TRAINING THERAPY: CPT

## 2017-09-15 PROCEDURE — A6198 ALGINATE DRESSING > 48 SQ IN: HCPCS

## 2017-09-15 PROCEDURE — 99232 SBSQ HOSP IP/OBS MODERATE 35: CPT | Performed by: PHYSICAL MEDICINE & REHABILITATION

## 2017-09-15 RX ADMIN — VENLAFAXINE 75 MG: 37.5 TABLET ORAL at 09:15

## 2017-09-15 RX ADMIN — FAMOTIDINE 20 MG: 20 TABLET, FILM COATED ORAL at 20:23

## 2017-09-15 RX ADMIN — ENOXAPARIN SODIUM 40 MG: 40 INJECTION SUBCUTANEOUS at 09:33

## 2017-09-15 RX ADMIN — HYDROCODONE BITARTRATE AND ACETAMINOPHEN 2 TABLET: 5; 325 TABLET ORAL at 22:38

## 2017-09-15 RX ADMIN — HYDROCODONE BITARTRATE AND ACETAMINOPHEN 1 TABLET: 5; 325 TABLET ORAL at 11:48

## 2017-09-15 RX ADMIN — TRAZODONE HYDROCHLORIDE 50 MG: 50 TABLET ORAL at 20:24

## 2017-09-15 RX ADMIN — HYDROCODONE BITARTRATE AND ACETAMINOPHEN 2 TABLET: 5; 325 TABLET ORAL at 05:41

## 2017-09-15 RX ADMIN — FAMOTIDINE 20 MG: 20 TABLET, FILM COATED ORAL at 09:15

## 2017-09-15 RX ADMIN — DOCUSATE SODIUM 100 MG: 100 CAPSULE ORAL at 09:15

## 2017-09-15 RX ADMIN — POLYETHYLENE GLYCOL 3350 17 G: 17 POWDER, FOR SOLUTION ORAL at 20:24

## 2017-09-15 ASSESSMENT — PAIN DESCRIPTION - PAIN TYPE
TYPE: ACUTE PAIN

## 2017-09-15 ASSESSMENT — PAIN DESCRIPTION - DESCRIPTORS
DESCRIPTORS: ACHING
DESCRIPTORS: SHARP
DESCRIPTORS: ACHING

## 2017-09-15 ASSESSMENT — PAIN SCALES - GENERAL
PAINLEVEL_OUTOF10: 4
PAINLEVEL_OUTOF10: 5
PAINLEVEL_OUTOF10: 6
PAINLEVEL_OUTOF10: 4
PAINLEVEL_OUTOF10: 0
PAINLEVEL_OUTOF10: 6
PAINLEVEL_OUTOF10: 9
PAINLEVEL_OUTOF10: 3
PAINLEVEL_OUTOF10: 0
PAINLEVEL_OUTOF10: 7

## 2017-09-15 ASSESSMENT — PAIN DESCRIPTION - LOCATION
LOCATION: RIB CAGE
LOCATION: RIB CAGE
LOCATION: RIB CAGE;GENERALIZED
LOCATION: RIB CAGE

## 2017-09-15 ASSESSMENT — PAIN DESCRIPTION - ORIENTATION
ORIENTATION: LEFT

## 2017-09-15 ASSESSMENT — PAIN DESCRIPTION - FREQUENCY: FREQUENCY: INTERMITTENT

## 2017-09-15 NOTE — PROGRESS NOTES
Short term goals: 1 week  Short term goal 1: Pt will complete ADL routine with no > min cues for NWB status L hand, R LE to increase independence in self care tasks   Short term goal 2: PT will complete basic transfers with CGA and no > min cues for safe setup and technique to increase independence in toileting routine  Short term goal 3: Pt will complete standing tasks x 2 min with CGA and no cues for NWB status to increase independence in toileting tasks  Short term goal 4: Pt will complete basic homemaking tasks with no > Supervision to increase independence in childcare tasks   Long term goals  Time Frame for Long term goals : 2-3 weeks  Long term goal 1: Pt will complete ADL routine with no cues for safe and adapted tech to increase independence in self care tasks  Long term goal 2: Pt will complete 2 step homemaking tasks with no cues for safety, attention to task and adapted tech to increase independence in simple cooking tasks

## 2017-09-15 NOTE — PROGRESS NOTES
[address] [address]  INPATIENT SPEECH THERAPY  [facility]    TIME   SLP Individual Minutes  Time In: 9138   Time Out: 1100   Minutes: 30   Timed Code Treatment Minutes: 30 Minutes       [x]Daily Note  []Progress Note  []Discharge Note    Date: 9/15/2017  Patient Name: Sukumar Ac        MRN: [id number]    : 1980  (30 y.o.)  Gender: female   Primary Provider: Adali Soriano MD  Admitting Diagnosis: TBI  Secondary Diagnosis: High level cognitive deficits   Precautions: Fall risk   Swallowing Status/Diet: Regular and thin   Swallowing Strategies: Standard   DATE of last MBS:  N/a     Subjective: Pt seen upright in wheelchair with  present and sleeping in recliner throughout session. Pt fully alert and cooperative. **Pt does demonstrate at least mild tangential speech production. Feel this is personality related rather than cognitive deficit as a result of dx for TBI. SHORT TERM GOAL #1:  Goal 1: Pt will complete complex recall (prospective, working, etc) with 80% accuracy, no cues to improve retention of newly learned complex medical information. INTERVENTIONS: Recall of 5 unrelated words (sweater, danielle, watch, table, van)  Immediate recall: 5/5 indep   Repetition: 5/5 indep   30 minute delay: 1/5 indep, 1/5 categorical cue, 3/5 FO2 required   *decreased retention of complex level unrelated material. Skilled level education provided in regards to use of memory strategies to improve retention of complex information (i.e. Association) with demonstrations provided utilizing above list at end of session. Recommend continued targeting of complex retention with focus on use of memory strategies to improve overall retention. SHORT TERM GOAL #2: NEW GOAL:   Goal 2: .  Pt will complete complex executive functioning, organization and reasoning tasks with 80% accuracy, no cues to maximize mental flexibility and complex sequential skills.    INTERVENTIONS: Pt provided with a
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
## 2017-09-15 NOTE — PLAN OF CARE
Problem: Falls - Risk of:  Goal: Will remain free from falls  Will remain free from falls   Outcome: Ongoing  Stand pivot assist of one with gait belt and walker. Gait slow and sometimes unsteady. Hourly rounding, falling star program, and bed/chair alarms on and working correctly. Problem: Pain Control  Goal: Maintain pain level at or below patients acceptable level (or 5 if patient is unable to determine acceptable level)  Outcome: Ongoing  Has prescribed pain medication per provider order that requests as needed. Goes more than 4 hours between taking pain medication. Problem: Anxiety:  Goal: Level of anxiety will decrease  Level of anxiety will decrease   Outcome: Ongoing  Is on anxiety medication, does exhibit signs of anxiety this shift. Is calm and appropriate. Problem: Activity:  Goal: Sleeping patterns will improve  Sleeping patterns will improve   Outcome: Ongoing  Patient has not slept on this shift    Problem: Skin Integrity:  Goal: Will show no infection signs and symptoms  Will show no infection signs and symptoms   Outcome: Ongoing  Patient shows no signs or symptoms of infection. Goal: Absence of new skin breakdown  Absence of new skin breakdown   Outcome: Ongoing  No new skin breakdown     Problem: Bleeding:  Goal: Will show no signs and symptoms of excessive bleeding  Will show no signs and symptoms of excessive bleeding   Outcome: Ongoing  No signs or symptoms of bleeding observed by this nurse or indicated via labs. Problem: Risk for Impaired Skin Integrity  Goal: Tissue integrity - skin and mucous membranes  Structural intactness and normal physiological function of skin and  mucous membranes. Outcome: Ongoing  Mucous membranes pink and moist. Splint to left arm that nursing does not remove        Problem: DISCHARGE BARRIERS  Goal: Patients continuum of care needs are met  Outcome: Ongoing  Patient continuum of care needs being met.  Patient involved in identifying barriers related to discharge. Comments:   Care plan reviewed with patient. Patient verbalize understanding of the plan of care and contribute to goal setting.

## 2017-09-16 PROCEDURE — 6360000002 HC RX W HCPCS: Performed by: NURSE PRACTITIONER

## 2017-09-16 PROCEDURE — 97532 HC COGNITIVE THERAPY 15 MIN: CPT

## 2017-09-16 PROCEDURE — 97110 THERAPEUTIC EXERCISES: CPT

## 2017-09-16 PROCEDURE — 97530 THERAPEUTIC ACTIVITIES: CPT

## 2017-09-16 PROCEDURE — 97116 GAIT TRAINING THERAPY: CPT

## 2017-09-16 PROCEDURE — 6360000002 HC RX W HCPCS: Performed by: PHYSICAL MEDICINE & REHABILITATION

## 2017-09-16 PROCEDURE — 97542 WHEELCHAIR MNGMENT TRAINING: CPT

## 2017-09-16 PROCEDURE — 6370000000 HC RX 637 (ALT 250 FOR IP): Performed by: NURSE PRACTITIONER

## 2017-09-16 PROCEDURE — 6370000000 HC RX 637 (ALT 250 FOR IP): Performed by: PHYSICAL MEDICINE & REHABILITATION

## 2017-09-16 PROCEDURE — 1180000000 HC REHAB R&B

## 2017-09-16 PROCEDURE — 97535 SELF CARE MNGMENT TRAINING: CPT

## 2017-09-16 RX ADMIN — FAMOTIDINE 20 MG: 20 TABLET, FILM COATED ORAL at 21:02

## 2017-09-16 RX ADMIN — VENLAFAXINE 75 MG: 37.5 TABLET ORAL at 09:46

## 2017-09-16 RX ADMIN — TRAZODONE HYDROCHLORIDE 50 MG: 50 TABLET ORAL at 21:02

## 2017-09-16 RX ADMIN — DOCUSATE SODIUM 100 MG: 100 CAPSULE ORAL at 09:46

## 2017-09-16 RX ADMIN — HYDROCODONE BITARTRATE AND ACETAMINOPHEN 2 TABLET: 5; 325 TABLET ORAL at 05:34

## 2017-09-16 RX ADMIN — ONDANSETRON 4 MG: 4 TABLET, FILM COATED ORAL at 09:46

## 2017-09-16 RX ADMIN — FAMOTIDINE 20 MG: 20 TABLET, FILM COATED ORAL at 09:47

## 2017-09-16 RX ADMIN — POLYETHYLENE GLYCOL 3350 17 G: 17 POWDER, FOR SOLUTION ORAL at 09:47

## 2017-09-16 RX ADMIN — POLYETHYLENE GLYCOL 3350 17 G: 17 POWDER, FOR SOLUTION ORAL at 21:02

## 2017-09-16 RX ADMIN — HYDROCODONE BITARTRATE AND ACETAMINOPHEN 2 TABLET: 5; 325 TABLET ORAL at 21:06

## 2017-09-16 RX ADMIN — ENOXAPARIN SODIUM 40 MG: 40 INJECTION SUBCUTANEOUS at 09:46

## 2017-09-16 ASSESSMENT — PAIN DESCRIPTION - LOCATION
LOCATION: RIB CAGE

## 2017-09-16 ASSESSMENT — PAIN SCALES - GENERAL
PAINLEVEL_OUTOF10: 5
PAINLEVEL_OUTOF10: 0
PAINLEVEL_OUTOF10: 5
PAINLEVEL_OUTOF10: 7
PAINLEVEL_OUTOF10: 5
PAINLEVEL_OUTOF10: 8
PAINLEVEL_OUTOF10: 5

## 2017-09-16 ASSESSMENT — PAIN DESCRIPTION - DESCRIPTORS
DESCRIPTORS: ACHING

## 2017-09-16 ASSESSMENT — PAIN DESCRIPTION - PAIN TYPE
TYPE: ACUTE PAIN

## 2017-09-16 ASSESSMENT — PAIN DESCRIPTION - ORIENTATION
ORIENTATION: LEFT
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
## 2017-09-16 NOTE — PROGRESS NOTES
cognition, Decreased safe awareness, Decreased ROM  Prognosis: Good  Discharge Recommendations: Patient would benefit from continued therapy after discharge, Continue to assess pending progress    Patient Education:  Patient Education: w/c mobility in tight spaces. Equipment Recommendations: Other: May need to consider a drop arm BSC. Pt checking to see if can borrow tub transfer bench from family. Safety:  Safety Devices in place: Yes  Type of devices: All fall risk precautions in place, Gait belt (Left sitting on toilet with pull cord)    Plan:  Times per week: 5xs week x 90 min , 1 x week x 30 min  Current Treatment Recommendations: Strengthening, Balance Training, Functional Mobility Training, Endurance Training, Safety Education & Training, Self-Care / ADL, Patient/Caregiver Education & Training, Equipment Evaluation, Education, & procurement, Home Management Training, Wheelchair Mobility Training    Goals:  Patient goals : Be able to care for myself as much as possible.      Short term goals  Time Frame for Short term goals: 1 week  Short term goal 1: Pt will complete ADL routine with no > min cues for NWB status L hand, R LE to increase independence in self care tasks   Short term goal 2: PT will complete basic transfers with CGA and no > min cues for safe setup and technique to increase independence in toileting routine  Short term goal 3: Pt will complete standing tasks x 2 min with CGA and no cues for NWB status to increase independence in toileting tasks  Short term goal 4: Pt will complete basic homemaking tasks with no > Supervision to increase independence in childcare tasks   Long term goals  Time Frame for Long term goals : 2-3 weeks  Long term goal 1: Pt will complete ADL routine with no cues for safe and adapted tech to increase independence in self care tasks  Long term goal 2: Pt will complete 2 step homemaking tasks with no cues for safety, attention to task and adapted tech to increase independence in simple cooking tasks

## 2017-09-16 NOTE — PLAN OF CARE
Problem: Falls - Risk of:  Goal: Will remain free from falls  Will remain free from falls   Outcome: Ongoing  Patient was free from falls this shift. Is on falling star program, staff members hourly rounding. Bed and chair alarms on at all times. Problem: Pain Control  Goal: Maintain pain level at or below patients acceptable level (or 5 if patient is unable to determine acceptable level)  Outcome: Ongoing  Has not taken pain medication this shift. Problem: Anxiety:  Goal: Level of anxiety will decrease  Level of anxiety will decrease   Outcome: Ongoing  Takes anti anxiety medication. Problem: Activity:  Goal: Sleeping patterns will improve  Sleeping patterns will improve   Outcome: Ongoing  Patient states having difficulty with sleeping at night. Does not sleep during the day    Problem: Skin Integrity:  Goal: Will show no infection signs and symptoms  Will show no infection signs and symptoms   Outcome: Ongoing  Shows no signs on infection. Goal: Absence of new skin breakdown  Absence of new skin breakdown   Outcome: Ongoing  Healing incision to left hand, unable to assess due to splint. Healing abrasions scattered that are open to air. Problem: Bleeding:  Goal: Will show no signs and symptoms of excessive bleeding  Will show no signs and symptoms of excessive bleeding   Outcome: Ongoing  . carlos

## 2017-09-17 PROCEDURE — 6360000002 HC RX W HCPCS: Performed by: PHYSICAL MEDICINE & REHABILITATION

## 2017-09-17 PROCEDURE — 6370000000 HC RX 637 (ALT 250 FOR IP): Performed by: NURSE PRACTITIONER

## 2017-09-17 PROCEDURE — 1180000000 HC REHAB R&B

## 2017-09-17 PROCEDURE — 6370000000 HC RX 637 (ALT 250 FOR IP): Performed by: PHYSICAL MEDICINE & REHABILITATION

## 2017-09-17 RX ADMIN — FAMOTIDINE 20 MG: 20 TABLET, FILM COATED ORAL at 20:20

## 2017-09-17 RX ADMIN — FAMOTIDINE 20 MG: 20 TABLET, FILM COATED ORAL at 10:06

## 2017-09-17 RX ADMIN — TRAZODONE HYDROCHLORIDE 50 MG: 50 TABLET ORAL at 20:20

## 2017-09-17 RX ADMIN — DOCUSATE SODIUM 100 MG: 100 CAPSULE ORAL at 20:20

## 2017-09-17 RX ADMIN — POLYETHYLENE GLYCOL 3350 17 G: 17 POWDER, FOR SOLUTION ORAL at 20:20

## 2017-09-17 RX ADMIN — ENOXAPARIN SODIUM 40 MG: 40 INJECTION SUBCUTANEOUS at 10:07

## 2017-09-17 RX ADMIN — HYDROCODONE BITARTRATE AND ACETAMINOPHEN 2 TABLET: 5; 325 TABLET ORAL at 22:37

## 2017-09-17 RX ADMIN — SENNA 17.2 MG: 8.6 TABLET, COATED ORAL at 20:20

## 2017-09-17 RX ADMIN — VENLAFAXINE 75 MG: 37.5 TABLET ORAL at 10:07

## 2017-09-17 RX ADMIN — HYDROCODONE BITARTRATE AND ACETAMINOPHEN 2 TABLET: 5; 325 TABLET ORAL at 10:07

## 2017-09-17 ASSESSMENT — PAIN SCALES - GENERAL
PAINLEVEL_OUTOF10: 8
PAINLEVEL_OUTOF10: 9
PAINLEVEL_OUTOF10: 0
PAINLEVEL_OUTOF10: 0

## 2017-09-17 ASSESSMENT — PAIN DESCRIPTION - LOCATION: LOCATION: ARM;LEG;RIB CAGE

## 2017-09-17 ASSESSMENT — PAIN DESCRIPTION - PAIN TYPE: TYPE: ACUTE PAIN

## 2017-09-18 PROCEDURE — 6370000000 HC RX 637 (ALT 250 FOR IP): Performed by: NURSE PRACTITIONER

## 2017-09-18 PROCEDURE — 97535 SELF CARE MNGMENT TRAINING: CPT

## 2017-09-18 PROCEDURE — 97532 HC COGNITIVE THERAPY 15 MIN: CPT

## 2017-09-18 PROCEDURE — 97542 WHEELCHAIR MNGMENT TRAINING: CPT

## 2017-09-18 PROCEDURE — 6360000002 HC RX W HCPCS: Performed by: PHYSICAL MEDICINE & REHABILITATION

## 2017-09-18 PROCEDURE — 97110 THERAPEUTIC EXERCISES: CPT

## 2017-09-18 PROCEDURE — 6370000000 HC RX 637 (ALT 250 FOR IP): Performed by: PHYSICAL MEDICINE & REHABILITATION

## 2017-09-18 PROCEDURE — 99232 SBSQ HOSP IP/OBS MODERATE 35: CPT | Performed by: PHYSICAL MEDICINE & REHABILITATION

## 2017-09-18 PROCEDURE — 97530 THERAPEUTIC ACTIVITIES: CPT

## 2017-09-18 PROCEDURE — 97116 GAIT TRAINING THERAPY: CPT

## 2017-09-18 PROCEDURE — 1180000000 HC REHAB R&B

## 2017-09-18 RX ADMIN — FAMOTIDINE 20 MG: 20 TABLET, FILM COATED ORAL at 21:22

## 2017-09-18 RX ADMIN — HYDROCODONE BITARTRATE AND ACETAMINOPHEN 2 TABLET: 5; 325 TABLET ORAL at 21:23

## 2017-09-18 RX ADMIN — DOCUSATE SODIUM 100 MG: 100 CAPSULE ORAL at 08:27

## 2017-09-18 RX ADMIN — POLYETHYLENE GLYCOL 3350 17 G: 17 POWDER, FOR SOLUTION ORAL at 21:22

## 2017-09-18 RX ADMIN — FAMOTIDINE 20 MG: 20 TABLET, FILM COATED ORAL at 08:27

## 2017-09-18 RX ADMIN — POLYETHYLENE GLYCOL 3350 17 G: 17 POWDER, FOR SOLUTION ORAL at 08:28

## 2017-09-18 RX ADMIN — VENLAFAXINE 75 MG: 37.5 TABLET ORAL at 08:27

## 2017-09-18 RX ADMIN — HYDROCODONE BITARTRATE AND ACETAMINOPHEN 2 TABLET: 5; 325 TABLET ORAL at 08:27

## 2017-09-18 RX ADMIN — TRAZODONE HYDROCHLORIDE 50 MG: 50 TABLET ORAL at 21:22

## 2017-09-18 RX ADMIN — ENOXAPARIN SODIUM 40 MG: 40 INJECTION SUBCUTANEOUS at 08:28

## 2017-09-18 ASSESSMENT — PAIN DESCRIPTION - FREQUENCY: FREQUENCY: INTERMITTENT

## 2017-09-18 ASSESSMENT — PAIN DESCRIPTION - ORIENTATION
ORIENTATION: LEFT

## 2017-09-18 ASSESSMENT — PAIN SCALES - GENERAL
PAINLEVEL_OUTOF10: 8
PAINLEVEL_OUTOF10: 0
PAINLEVEL_OUTOF10: 8
PAINLEVEL_OUTOF10: 8
PAINLEVEL_OUTOF10: 3
PAINLEVEL_OUTOF10: 4
PAINLEVEL_OUTOF10: 5
PAINLEVEL_OUTOF10: 4

## 2017-09-18 ASSESSMENT — PAIN DESCRIPTION - PAIN TYPE
TYPE: ACUTE PAIN

## 2017-09-18 ASSESSMENT — PAIN DESCRIPTION - LOCATION
LOCATION: RIB CAGE
LOCATION: ARM;RIB CAGE
LOCATION: RIB CAGE

## 2017-09-18 ASSESSMENT — PAIN DESCRIPTION - DESCRIPTORS
DESCRIPTORS: ACHING

## 2017-09-18 ASSESSMENT — PAIN DESCRIPTION - DIRECTION: RADIATING_TOWARDS: LEFT RIB CAGE

## 2017-09-18 NOTE — PROGRESS NOTES
[address] [address]  INPATIENT SPEECH THERAPY  [facility]    TIME   SLP Individual Minutes  Time In: 1100  Time Out: 1130   Minutes: 30    Timed Code Treatment Minutes: 30 minutes        [x]Daily Note  []Progress Note  []Discharge Note    Date: 2017  Patient Name: Kimberley Flores        MRN: [id number]    : 1980  (30 y.o.)  Gender: female   Primary Provider: Kyler Devries MD  Admitting Diagnosis: TBI  Secondary Diagnosis: High level cognitive deficits   Precautions: Fall risk   Swallowing Status/Diet: Regular and thin   Swallowing Strategies: Standard   DATE of last MBS:  N/a     Subjective: Pt seen upright in wheelchair. Alert and pleasant. Pt continues to demonstrate mild high level recall deficits as well as high level mental manipulation/sequencing per pt report. Will continue to provide ST follow up for 2-3 additional sessions in order to further address and establish HEP for pt. SHORT TERM GOAL #1:  Goal 1: Pt will complete complex recall (prospective, working, etc) with 80% accuracy, no cues to improve retention of newly learned complex medical information. INTERVENTIONS: Recall of 5 unrelated words:   Immediate recall: 5/5 indep   *pt encouraged to write down words and complete association to further maximize complex retention. 25 minute delay: 3/5 indep, 2/5 min cues to utilize external aid  *pt report of difficulty sequencing through steps of recipe during prior therapy task with ST with frequent self corrections. Will continue to address high level recall, working retention and mental manipulation in future sessions. SHORT TERM GOAL #2:  Goal 2: .  Pt will complete complex executive functioning, organization and reasoning tasks with 80% accuracy, no cues to maximize mental flexibility and complex sequential skills.    INTERVENTIONS: Pt did report hx of learning disability with IEP and need for extra time to complete tests, have questions read to here and and/or discharge information  Method of Education:  [x]Discussion          []Demonstration          []Handout          []Other  Evaluation of Education:   [x]Verbalized understanding   [x]Demonstrates without assistance  []Demonstrates with assistance  []Needs further instruction     []No evidence of learning                  [x]No family present      Plan: [x]Continue with current plan of care    []Modify current plan of care as follows:    []Discharge patient    Discharge Location:    Services/Supervision Recommended:     [x]Patient continues to require treatment by a licensed therapist to address functional deficits as outlined in the established plan of care.     Yvonne Dawkins M.S. Marc Brooks 9/18/2017
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
## 2017-09-18 NOTE — PROGRESS NOTES
Pneumonia      Past Surgical History:   Procedure Laterality Date     SECTION      CHOLECYSTECTOMY      FRACTURE SURGERY      right foot    NV REPAIR EXTEN TENDON,DORSUM HAND,EA Left 2017    LEFT HAND TENDON REPAIR INDEX, MIDDLE, & RING FINGERS performed by Nathan Grier MD at [address]             Subjective  Subjective: Pt pleasant and cooperative, requested a sponge bath instead of a shower when OT offered. Pain:  Pain Assessment  Patient Currently in Pain: Yes  Pain Level: 4  Pain Type: Acute pain  Pain Location: Arm;Rib cage  Pain Orientation: Left       Objective  Overall Cognitive Status: WNL  Cognition Comment: for ADL s    Transfers  Sit to stand: Stand by assistance  Stand to sit: Stand by assistance  Transfer Comments: Able to maintain NWBing RLE. Pt managed leg rests herself with min vcs to drop down extended leg rest     Balance  Sitting Balance: Supervision  Standing Balance: Contact guard assistance     Time: x1 minute standing at sink to wash hands      Functional Mobility  Functional - Mobility Device: Platform walker  Activity: To/from bathroom (5 feet into bathroom, turning from toilet to sink )  Assist Level: Contact guard assistance  Functional Mobility Comments: Able to maintain NWBing during ambulation   Apparatus Needs: Right;Knee Immobilizer     Groomin - Able to perform 3-4 tasks with touching help (CGA while standing at sink to wash hands )  Bathin - Able to bathe 8-9 areas (A to wash R foot and thoroughness of bottom )  Dressing-Upper: 4 - Requires assist with buttons/zippers only and/or requires assist with one arm only (A to pull down shirt in back )  Dressing-Lower: 1 - Total assist with lower body dressing (Total A to doff and don shoe. Discussed elastic shoelaces with pt, pt agreeable to trial, however none in stock.  Will continue to address.  )  Toiletin - Able to perform 1 task only (e.g. hygiene) (A to manage L side due to tight clothing. Pt able to complete hygiene with S )  Toilet Transfer: 4 - Requires steadying assistance only < 25% assist (CGA from standard toilet using grab bar. Pt states she preferred not having BSC over toilet. Tolerated well with no increase in assist level despite lower surface with use of grab  Bar. )    Trialed dressing stick in bathroom with minimal success. Limited by tight clothing to manage underwear up and down. Discussed leaving dressing stick in bathroom for continued trials and pt agreeable. States she will ask her mother to  new underwear. Activity Tolerance:  Activity Tolerance: Patient Tolerated treatment well    Assessment:     Performance deficits / Impairments: Decreased functional mobility , Decreased endurance, Decreased ADL status, Decreased balance, Decreased strength, Decreased high-level IADLs, Decreased cognition, Decreased safe awareness, Decreased ROM  Prognosis: Good  Discharge Recommendations: Patient would benefit from continued therapy after discharge, Continue to assess pending progress    Patient Education:  Patient Education: dressing stick, toilet tongs, grab bar placement in bathroom, elastic shoe laces     Equipment Recommendations: Other: Recommned grab next to toilet, Pt checking to see if she can borrow a tub transfer bench from family. Safety:  Safety Devices in place: Yes  Type of devices: All fall risk precautions in place, Call light within reach, Gait belt, Left in chair    Plan:  Times per week: 5xs week x 90 min , 1 x week x 30 min  Current Treatment Recommendations: Strengthening, Balance Training, Functional Mobility Training, Endurance Training, Safety Education & Training, Self-Care / ADL, Patient/Caregiver Education & Training, Equipment Evaluation, Education, & procurement, Home Management Training, Wheelchair Mobility Training    Goals:  Patient goals : Be able to care for myself as much as possible.      Short term goals  Time Frame for Short term goals: 1 week  Short term goal 1: Pt will complete ADL routine with no > min cues for NWB status L hand, R LE to increase independence in self care tasks   Short term goal 2: PT will complete basic transfers with CGA and no > min cues for safe setup and technique to increase independence in toileting routine  Short term goal 3: Pt will complete standing tasks x 2 min with CGA and no cues for NWB status to increase independence in toileting tasks  Short term goal 4: Pt will complete basic homemaking tasks with no > Supervision to increase independence in childcare tasks   Long term goals  Time Frame for Long term goals : 2-3 weeks  Long term goal 1: Pt will complete ADL routine with no cues for safe and adapted tech to increase independence in self care tasks  Long term goal 2: Pt will complete 2 step homemaking tasks with no cues for safety, attention to task and adapted tech to increase independence in simple cooking tasks

## 2017-09-18 NOTE — PROGRESS NOTES
[address] [address]  Recreational Therapy  Daily Note  [facility]    Time Spent with Patient: 30 minutes    Date:  9/18/2017       Patient Name: Luba Dennis      MRN: [id number]      YOB: 1980 (30 y.o.)       Gender: female  Diagnosis: TBI  Referring Practitioner: HAYLEY Watson CNP for Dr. Ana Sweeney    RESTRICTIONS/PRECAUTIONS:  Restrictions/Precautions: Weight Bearing, Fall Risk  Vision: Within Functional Limits  Hearing: Within functional limits    PAIN: 0-no c/o pain    SUBJECTIVE:  I like to do crafts with my kids     OBJECTIVE:  Pt came to our dining room with her O.T.  Where she enjoyed doing crafts with peers-after therapy she stayed to finish her leaf wreath and shared pictures of her kids with peers-very social and very pleasant    Social Interaction: 7 - Patient has appropriate behavior/relations 100% of the time    Patient Education  New Education Provided: Importance of Leisure,     Electronically signed by: ANY Velasquez  Date: 9/18/2017

## 2017-09-18 NOTE — PROGRESS NOTES
Tile  Method: RUE;LLE  Level of Assistance: Supervision  Description/ Details: Slow propulsion with good manueverability,  navigating through cones placed close together on floor, did bump into cones on 2 different occasions. Distance: 90 ft. x1 and 130 ft. x1     Exercises:  Exercises  Comments: Patient completed supine HEP of Dg LE ankle pumps, glut sets, quad sets, LLE heel slides x10 reps each, started to have increased pain in rib cage and requested to sit up, complained of dizziness when initially sitting up but subsided with short rest.  All for increased strength/endurance for improved functional mobility. Activity Tolerance:  Activity Tolerance: Patient Tolerated treatment well    Assessment: Body structures, Functions, Activity limitations: Decreased functional mobility , Decreased ROM, Decreased strength, Decreased endurance, Decreased balance  Assessment: Patient tolerated session well, good carry over of techniques and weight-bearing restrictions, was limited by increased pain in rib cage this session. Prognosis: Good  Discharge Recommendations: Continue to assess pending progress, Patient would benefit from continued therapy after discharge    Patient Education:  Patient Education: POC, bed mobility, transfers, gait training, WC mobility through tight spaces, HEP   Barriers to Learning: None    Equipment Recommendations:  Equipment Needed: Yes  Mobility Devices: Wheelchair, Christen Shield: Rolling, Platform Left  Wheelchair: Standard    Safety:  Type of devices:  All fall risk precautions in place, Call light within reach, Left in chair, Patient at risk for falls, Gait belt    Plan:  Times per week: 5x/ wk 90 min, 1x/ wk 30 min  Specific instructions for Next Treatment: therex, w/c mobility, transfers, gait, car transfer  Current Treatment Recommendations: Strengthening, ROM, Balance Training, Functional Mobility Training, Transfer Training, Endurance Training, Wheelchair Mobility Training,
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
## 2017-09-19 PROCEDURE — 97530 THERAPEUTIC ACTIVITIES: CPT

## 2017-09-19 PROCEDURE — 6370000000 HC RX 637 (ALT 250 FOR IP): Performed by: NURSE PRACTITIONER

## 2017-09-19 PROCEDURE — 99232 SBSQ HOSP IP/OBS MODERATE 35: CPT | Performed by: NURSE PRACTITIONER

## 2017-09-19 PROCEDURE — 97542 WHEELCHAIR MNGMENT TRAINING: CPT

## 2017-09-19 PROCEDURE — 97532 HC COGNITIVE THERAPY 15 MIN: CPT

## 2017-09-19 PROCEDURE — 97110 THERAPEUTIC EXERCISES: CPT

## 2017-09-19 PROCEDURE — 1180000000 HC REHAB R&B

## 2017-09-19 PROCEDURE — 6360000002 HC RX W HCPCS: Performed by: PHYSICAL MEDICINE & REHABILITATION

## 2017-09-19 PROCEDURE — 97535 SELF CARE MNGMENT TRAINING: CPT

## 2017-09-19 PROCEDURE — 6370000000 HC RX 637 (ALT 250 FOR IP): Performed by: PHYSICAL MEDICINE & REHABILITATION

## 2017-09-19 RX ADMIN — DOCUSATE SODIUM 100 MG: 100 CAPSULE ORAL at 07:40

## 2017-09-19 RX ADMIN — FAMOTIDINE 20 MG: 20 TABLET, FILM COATED ORAL at 07:39

## 2017-09-19 RX ADMIN — HYDROCODONE BITARTRATE AND ACETAMINOPHEN 2 TABLET: 5; 325 TABLET ORAL at 02:01

## 2017-09-19 RX ADMIN — POLYETHYLENE GLYCOL 3350 17 G: 17 POWDER, FOR SOLUTION ORAL at 07:39

## 2017-09-19 RX ADMIN — ENOXAPARIN SODIUM 40 MG: 40 INJECTION SUBCUTANEOUS at 07:40

## 2017-09-19 RX ADMIN — FAMOTIDINE 20 MG: 20 TABLET, FILM COATED ORAL at 20:29

## 2017-09-19 RX ADMIN — TRAZODONE HYDROCHLORIDE 50 MG: 50 TABLET ORAL at 20:29

## 2017-09-19 RX ADMIN — POLYETHYLENE GLYCOL 3350 17 G: 17 POWDER, FOR SOLUTION ORAL at 20:29

## 2017-09-19 RX ADMIN — HYDROCODONE BITARTRATE AND ACETAMINOPHEN 1 TABLET: 5; 325 TABLET ORAL at 06:26

## 2017-09-19 RX ADMIN — VENLAFAXINE 75 MG: 37.5 TABLET ORAL at 07:39

## 2017-09-19 RX ADMIN — HYDROCODONE BITARTRATE AND ACETAMINOPHEN 2 TABLET: 5; 325 TABLET ORAL at 22:00

## 2017-09-19 RX ADMIN — HYDROCODONE BITARTRATE AND ACETAMINOPHEN 1 TABLET: 5; 325 TABLET ORAL at 18:05

## 2017-09-19 ASSESSMENT — PAIN DESCRIPTION - LOCATION
LOCATION: RIB CAGE
LOCATION: KNEE
LOCATION: RIB CAGE
LOCATION: KNEE

## 2017-09-19 ASSESSMENT — PAIN SCALES - GENERAL
PAINLEVEL_OUTOF10: 0
PAINLEVEL_OUTOF10: 8
PAINLEVEL_OUTOF10: 6
PAINLEVEL_OUTOF10: 5
PAINLEVEL_OUTOF10: 0
PAINLEVEL_OUTOF10: 9
PAINLEVEL_OUTOF10: 0
PAINLEVEL_OUTOF10: 5
PAINLEVEL_OUTOF10: 7
PAINLEVEL_OUTOF10: 5
PAINLEVEL_OUTOF10: 8
PAINLEVEL_OUTOF10: 5

## 2017-09-19 ASSESSMENT — PAIN DESCRIPTION - ORIENTATION
ORIENTATION: LEFT

## 2017-09-19 ASSESSMENT — PAIN DESCRIPTION - DESCRIPTORS
DESCRIPTORS: ACHING

## 2017-09-19 ASSESSMENT — PAIN DESCRIPTION - PAIN TYPE
TYPE: ACUTE PAIN

## 2017-09-19 ASSESSMENT — PAIN DESCRIPTION - FREQUENCY: FREQUENCY: INTERMITTENT

## 2017-09-19 NOTE — PROGRESS NOTES
been updated to reflect continued progress. The patient is limited by weight bearing restrictions, decreased overall strength, and decreased balance. The patient demonstrates good safety awareness and improvements with propelling and navigating wheelchair. Treatment session and note completed by Dennie Ferrier, PTA. Assessment and goal revision of Progress Note completed by Anjel Best PT. Patient Education:  Patient Education: ramp training. gait technique. Barriers to Learning: None    Equipment Recommendations:  Equipment Needed: Yes  Mobility Devices: Wheelchair, Kenzie Pollen: [employer], Platform Left  Wheelchair: Standard    Safety:  Type of devices: All fall risk precautions in place, Call light within reach, Left in chair, Patient at risk for falls, Gait belt    Plan:  Times per week: 5x/ wk 90 min, 1x/ wk 30 min  Specific instructions for Next Treatment: therex, w/c mobility, transfers, gait, car transfer  Current Treatment Recommendations: Strengthening, ROM, Balance Training, Functional Mobility Training, Transfer Training, Endurance Training, Wheelchair Mobility Training, Equipment Evaluation, Education, & procurement, Patient/Caregiver Education & Training, Safety Education & Training    Goals:  Patient goals : Get moving on my own better    Short term goals  Time Frame for Short term goals: 1 wk  Short term goal 1: Pt to sit <-> supine CGA to get in and out of bed - MET  Short term goal 2: Pt to transfer sit <-> stand with platform RW Nadia to get in and out of bed/chairs, maintaining NWB RLE - MET  Short term goal 3: Pt to perform stand/pivot transfer +1 min assist, maintaining NWB RLE, to get in/out of w/c. -MET  Short term goal 4: Pt to propel w/c >= 50 ft, indoor surfaces, for home mobility. -MET  Short term goal 5: Pt to ascend/descend 25ft ramp,+1 min assist to get in/out of house -MET  Short term goal 6: Pt to walk with pf RW >= 10 ft, +1 min assist to get in/out of bathroom -MET  .     Long
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
## 2017-09-19 NOTE — PROGRESS NOTES
[address] [place] [address]  INPATIENT SPEECH THERAPY  [facility]    TIME   SLP Individual Minutes  Time In: 2388  Time Out: 1130   Minutes: 18  Timed Code Treatment Minutes: 18 Minutes       []Daily Note  [x]Progress Note  []Discharge Note    Date: 2017  Patient Name: Imelda Kaye        MRN: [id number]    : 1980  (30 y.o.)  Gender: female   Primary Provider: Ayana Augustine MD  Admitting Diagnosis: TBI  Secondary Diagnosis: High level cognitive deficits   Precautions: Fall risk   Swallowing Status/Diet: Regular and thin   Swallowing Strategies: Standard   DATE of last MBS:  N/a     Subjective: Pt upright in wheelchair. Highly pleasant and engaged. Plans for follow up x1 additional session to establish HEP. Shortened session this date d/t overextended team conference. Spoke with PT, Anupama/OTHimanshu to ensure appropriate therapy minutes for this date. SHORT TERM GOAL #1:  Goal 1: Pt will complete complex recall (prospective, working, etc) with 80% accuracy, no cues to improve retention of newly learned complex medical information. GOAL MET- CONTINUE FOR CONSISTENCY   INTERVENTIONS: Recall of 5 unrelated words:   24 hour delay:  indep   Further 18 minute delay: / indep     Recall of complex medical appointment change containing x8 units of information:   Immediate recall:  indep   10 minute delay:  indep   18 minute delay:  indep     Working recall via stating words in order of occurrence-- Immediate recall:  indep,  correct words; incorrect word order, / min cues  Mental manipulation:  indep, 3 min cues     Working recall via stating words in ranking order-- Immediate recall: 10/10 indep   Mental manipulation: 8/10 indep, 2/10 self correction       SHORT TERM GOAL #2:  Goal 2: .  Pt will complete complex executive functioning, organization and reasoning tasks with 80% accuracy, no cues to maximize mental flexibility and complex sequential skills. schedules)  Memory: 7 - Patient independent with meds/people/schedule    ASSESSMENT:  Assessment: [x]Progressing towards goals          []Not Progressing towards goals  SUMMARY: Pt has met above 3/3 STG's and 1/1 LTG's this therapy period. Demonstrates cognitive skills that appear at prior baseline and are [place] [place]. Pt wanting additional stimuli to challenge skills herself and upon discharge. Recommendations for additional therapy session x1 to establish HEP. Patient Tolerance of Treatment:   [x]Tolerated well []Tolerated fair []Required rest breaks []Fatigued  Plan for Next Session:  Establishment of HEP   Education:  Learner:  [x]Patient          []Significant Other          []Other  Education provided regarding:  [x]Goals and POC   []Diet and swallowing precautions    []Home Exercise Program  [x]Progress and/or discharge information  Method of Education:  [x]Discussion          []Demonstration          []Handout          []Other  Evaluation of Education:   [x]Verbalized understanding   [x]Demonstrates without assistance  []Demonstrates with assistance  []Needs further instruction     []No evidence of learning                  [x]No family present      Plan: [x]Continue with current plan of care    []Modify current plan of care as follows:    []Discharge patient    Discharge Location:    Services/Supervision Recommended:     [x]Patient continues to require treatment by a licensed therapist to address functional deficits as outlined in the established plan of care.     Sam Parmar M.S. [employer] 9/19/2017

## 2017-09-19 NOTE — PLAN OF CARE
Problem: Falls - Risk of:  Goal: Will remain free from falls  Will remain free from falls   Outcome: Ongoing  Fall protocol initiated. Yellow armband on arm. Problem: Pain Control  Goal: Maintain pain level at or below patients acceptable level (or 5 if patient is unable to determine acceptable level)  Outcome: Ongoing  Patient informs staff of onset of pain     Problem: Anxiety:  Goal: Level of anxiety will decrease  Level of anxiety will decrease   Outcome: Ongoing  Remaining in a calm environment. Talking her situations out with staff    Problem: Activity:  Goal: Sleeping patterns will improve  Sleeping patterns will improve   Outcome: Ongoing  Sleeping aide given as ordered. Problem: Skin Integrity:  Goal: Will show no infection signs and symptoms  Will show no infection signs and symptoms   Outcome: Ongoing  No sign or symptom of infection noted. Goal: Absence of new skin breakdown  Absence of new skin breakdown   Outcome: Ongoing  Assessments completed each shift     Problem: Bleeding:  Goal: Will show no signs and symptoms of excessive bleeding  Will show no signs and symptoms of excessive bleeding   Outcome: Ongoing  No sign or symptom of excessive bleeding noted. Problem: Risk for Impaired Skin Integrity  Goal: Tissue integrity - skin and mucous membranes  Structural intactness and normal physiological function of skin and  mucous membranes. Outcome: Ongoing  Assessments completed each shift. Patient repositions self     Problem: DISCHARGE BARRIERS  Goal: Patients continuum of care needs are met  Outcome: Ongoing    Problem: Mobility - Impaired:  Goal: Mobility will improve  Mobility will improve   Outcome: Ongoing  Patient working with therapy to improve mobility     Comments:   Care plan reviewed with patient. Patient verbalize understanding of the plan of care and contribute to goal setting.

## 2017-09-19 NOTE — PROGRESS NOTES
Julianne Saver: Rolling, Platform Left  Wheelchair: Standard    Safety:  Type of devices: All fall risk precautions in place, Call light within reach, Left in chair, Patient at risk for falls, Gait belt    Plan:  Times per week: 5x/ wk 90 min, 1x/ wk 30 min  Specific instructions for Next Treatment: therex, w/c mobility, transfers, gait, car transfer  Current Treatment Recommendations: Strengthening, ROM, Balance Training, Functional Mobility Training, Transfer Training, Endurance Training, Wheelchair Mobility Training, Equipment Evaluation, Education, & procurement, Patient/Caregiver Education & Training, Safety Education & Training    Goals:  Patient goals : Get moving on my own better    Short term goals  Time Frame for Short term goals: 1 wk  Short term goal 1: Pt to sit <-> supine CGA to get in and out of bed  Short term goal 2: Pt to transfer sit <-> stand with platform RW Nadia to get in and out of bed/chairs, maintaining NWB RLE   Short term goal 3: Pt to perform stand/pivot transfer +1 min assist, maintaining NWB RLE, to get in/out of w/c. Short term goal 4: Pt to propel w/c >= 50 ft, indoor surfaces, for home mobility. Short term goal 5: Pt to ascend/descend 25ft ramp,+1 min assist to get in/out of house  Short term goal 6: Pt to walk with pf RW >= 10 ft, +1 min assist to get in/out of bathroom    Long term goals  Time Frame for Long term goals : 3 wks  Long term goal 1: Pt to sit <-> supine Mod I to get in and out of bed  Long term goal 2: Pt to transfer sit <-> stand with platform RW , Mod I, to get in and out of bed/chairs, maintaining NWB RLE   Long term goal 3: Pt to propel w/c >= 150 ft, indoor surfaces, for home mobility. Long term goal 4: Pt to perform stand/pivot transfer Mod I, maintaining NWB RLE, to get in/out of w/c. Long term goal 5: Pt to get in/out of car, +1 min assist for transportation needs.   Long term goal 6: Pt to walk with pf RW >= 10 ft, +1 stand by assist to get in/out of

## 2017-09-20 PROCEDURE — 6370000000 HC RX 637 (ALT 250 FOR IP): Performed by: NURSE PRACTITIONER

## 2017-09-20 PROCEDURE — 97532 HC COGNITIVE THERAPY 15 MIN: CPT

## 2017-09-20 PROCEDURE — 97530 THERAPEUTIC ACTIVITIES: CPT

## 2017-09-20 PROCEDURE — 1180000000 HC REHAB R&B

## 2017-09-20 PROCEDURE — 6370000000 HC RX 637 (ALT 250 FOR IP): Performed by: PHYSICAL MEDICINE & REHABILITATION

## 2017-09-20 PROCEDURE — 97116 GAIT TRAINING THERAPY: CPT

## 2017-09-20 PROCEDURE — 97110 THERAPEUTIC EXERCISES: CPT

## 2017-09-20 PROCEDURE — 6360000002 HC RX W HCPCS: Performed by: PHYSICAL MEDICINE & REHABILITATION

## 2017-09-20 PROCEDURE — 97535 SELF CARE MNGMENT TRAINING: CPT

## 2017-09-20 PROCEDURE — 99233 SBSQ HOSP IP/OBS HIGH 50: CPT | Performed by: PHYSICAL MEDICINE & REHABILITATION

## 2017-09-20 RX ADMIN — HYDROCODONE BITARTRATE AND ACETAMINOPHEN 2 TABLET: 5; 325 TABLET ORAL at 04:56

## 2017-09-20 RX ADMIN — POLYETHYLENE GLYCOL 3350 17 G: 17 POWDER, FOR SOLUTION ORAL at 21:32

## 2017-09-20 RX ADMIN — VENLAFAXINE 75 MG: 37.5 TABLET ORAL at 09:11

## 2017-09-20 RX ADMIN — TRAZODONE HYDROCHLORIDE 50 MG: 50 TABLET ORAL at 21:32

## 2017-09-20 RX ADMIN — DOCUSATE SODIUM 100 MG: 100 CAPSULE ORAL at 09:12

## 2017-09-20 RX ADMIN — ENOXAPARIN SODIUM 40 MG: 40 INJECTION SUBCUTANEOUS at 09:12

## 2017-09-20 RX ADMIN — FAMOTIDINE 20 MG: 20 TABLET, FILM COATED ORAL at 09:12

## 2017-09-20 RX ADMIN — HYDROCODONE BITARTRATE AND ACETAMINOPHEN 2 TABLET: 5; 325 TABLET ORAL at 15:50

## 2017-09-20 RX ADMIN — HYDROCODONE BITARTRATE AND ACETAMINOPHEN 1 TABLET: 5; 325 TABLET ORAL at 21:42

## 2017-09-20 RX ADMIN — FAMOTIDINE 20 MG: 20 TABLET, FILM COATED ORAL at 21:32

## 2017-09-20 ASSESSMENT — PAIN DESCRIPTION - DESCRIPTORS
DESCRIPTORS: ACHING
DESCRIPTORS: ACHING;CONSTANT
DESCRIPTORS: ACHING

## 2017-09-20 ASSESSMENT — PAIN DESCRIPTION - ORIENTATION
ORIENTATION: LEFT

## 2017-09-20 ASSESSMENT — PAIN SCALES - GENERAL
PAINLEVEL_OUTOF10: 8
PAINLEVEL_OUTOF10: 7
PAINLEVEL_OUTOF10: 6
PAINLEVEL_OUTOF10: 0
PAINLEVEL_OUTOF10: 8
PAINLEVEL_OUTOF10: 4

## 2017-09-20 ASSESSMENT — PAIN DESCRIPTION - LOCATION
LOCATION: RIB CAGE

## 2017-09-20 ASSESSMENT — PAIN DESCRIPTION - PAIN TYPE
TYPE: ACUTE PAIN

## 2017-09-20 NOTE — PROGRESS NOTES
discharge)  Entrance Stairs - Number of Steps: 3  Entrance Stairs - Rails: None  Home Equipment: Rolling walker     Objective:  Supine to Sit: Supervision (room bed )      Transfers  Sit to Stand: Contact guard assistance  Stand to sit: Contact guard assistance  Car Transfer: Contact guard assistance (extended time spent at car discussing various modes of transportation pt may be utilizing after discharge;  pt reporting likely will be three vehicles: a car, a small truck and a larger/higher truck;   PTA educated to try to see if family can swap vehicles to avoid the high truck and she agreed but reports may not always be possible;  PTA demonstrated how would have to utilize small stool placed outside vehicle frame and retro hopped onto and then complete transfer from stool top ; and educated in that persons need to obtain non-slip 6 inch stool and then to leave it in the car for her to use; PTA only demo'd pt did not perfrom)- pt did perfrom regular car t-rojelio from level floor- pt self assists right LE in and out - no vc's needed        Ambulation 1  Surface: level tile  Device: Rolling Walker;Platform Walker left  Assistance: Contact guard assistance  Quality of Gait: in tight areas, including at car area with small curb; good walker use in tight area   Distance: approx 5-8 feet x 3               Propulsion 1  Propulsion: Manual  Level: Level Tile  Method: RUE;LLE  Level of Assistance: Modified independent  Description/ Details: included doorways, pt managing own w/c parts and parking and backing   Distance: approx 300 feet x 1 and 140 feet x 1                                       Activity Tolerance:  Activity Tolerance: Patient Tolerated treatment well    Assessment:   Body structures, Functions, Activity limitations: Decreased functional mobility , Decreased ROM, Decreased strength, Decreased endurance, Decreased balance  Assessment: inintating discussion of family ed and problem solving, pt very involved with platform RW , Mod I, to get in and out of bed/chairs, maintaining NWB RLE   Long term goal 3: Pt to propel w/c >= 150 ft, indoor surfaces, gravel surface, and uneven surface with mod I for increased mobility. Long term goal 4: Pt to perform stand/pivot transfer Mod I, maintaining NWB RLE, to get in/out of w/c. Long term goal 5: Pt to get in/out of car, with supervision for transportation needs.   Long term goal 6: Pt to walk with pf RW >= 10 ft, +1 mod I to get in/out of bathroom
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
## 2017-09-20 NOTE — PROGRESS NOTES
discharge)  Entrance Stairs - Number of Steps: 3  Entrance Stairs - Rails: None  Home Equipment: Rolling walker     Objective:  Supine to Sit: Supervision (on and off mat table ; pt self assists braced leg )  Sit to Supine: Supervision  Scooting: Supervision    Transfers  Sit to Stand: Contact guard assistance (becoming SBA x 1- w/c and EOM - good technique and manages own braced arm in and out of platform )  Stand to sit: Contact guard assistance       Ambulation 1  Surface: level tile  Device: Rolling Walker;Platform Walker left  Assistance: Contact guard assistance  Quality of Gait: good balance and safety awareness, good judgement of her own tolerance ; w/c follow; good left LE foot clearance   Distance: approx 10 feet x 2 and 3 feet x 2               Propulsion 1  Propulsion: Manual  Level: Level Tile  Method: RUE;LLE  Level of Assistance: Supervision  Description/ Details: included 7 K ramp where PTA was close SBA x 1; pt ascends ramp in retro and descends forward, excellent control for both directions this date; pt not showing signs of getting out of breath and talking during most of session; included crowded halls and thru various doorways and turning around - pt manages own brakes and leg rests   Distance: >150 feet x 2 and >400 feet x 2 and many small distances                 Exercises:  Exercises  Comments: supine connor LE AP, glute sets, quad sets, and hip ABD/ADD (Nadia from PTA for elevation of right leg);   left leg only SAQ and heelslides all x 15 reps each per HEP review          Activity Tolerance:   very good    Assessment:   Body structures, Functions, Activity limitations: Decreased functional mobility , Decreased ROM, Decreased strength, Decreased endurance, Decreased balance  Assessment: improving w/c mobility distances and lessening assist levels; good retention of learned information;  recommend family ed to multiple family members (pt is to begin to contact them today)   Prognosis: Good  Discharge Recommendations: Continue to assess pending progress, Patient would benefit from continued therapy after discharge    Patient Education:  Patient Education: reviewed HEP   Barriers to Learning: None    Equipment Recommendations:  Equipment Needed: Yes  Mobility Devices: Wheelchair, Julianne Saver: Rolling, Platform Left  Wheelchair: Standard    Safety:  Type of devices: All fall risk precautions in place, Call light within reach, Left in chair, Patient at risk for falls, Gait belt    Plan:  Times per week: 5x/ wk 90 min, 1x/ wk 30 min  Specific instructions for Next Treatment: therex, w/c mobility, transfers, gait, car transfer  Current Treatment Recommendations: Strengthening, ROM, Balance Training, Functional Mobility Training, Transfer Training, Endurance Training, Wheelchair Mobility Training, Equipment Evaluation, Education, & procurement, Patient/Caregiver Education & Training, Safety Education & Training    Goals:  Patient goals : Get moving on my own better    Short term goals  Time Frame for Short term goals: 1 wk  Short term goal 1: Pt to sit <-> supine supervision to get in and out of bed  Short term goal 2: Pt to transfer sit <-> stand with platform RW supervision to get in and out of bed/chairs, maintaining NWB RLE   Short term goal 3: Pt to perform stand/pivot transfer +1 supervision assist, maintaining NWB RLE, to get in/out of w/c. Short term goal 4: Pt to propel w/c >= 100 ft, indoor surfaces, gravel surface, and uneven surface with supervision for increased mobility.   Short term goal 5: Pt to ascend/descend 25ft ramp,+1 supervision assist to get in/out of house  Short term goal 6: Pt to walk with pf RW >= 10 ft, +1 supervision to get in/out of bathroom    Long term goals  Time Frame for Long term goals : 3 wks  Long term goal 1: Pt to sit <-> supine Mod I to get in and out of bed  Long term goal 2: Pt to transfer sit <-> stand with platform RW , Mod I, to get in and out of
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
## 2017-09-20 NOTE — PROGRESS NOTES
[address] [address]  Recreational Therapy  Daily Note  [facility]    Time Spent with Patient: 0 minutes    Date:  9/20/2017       Patient Name: Iveth Guzman      MRN: [id number]      YOB: 1980 (30 y.o.)       Gender: female  Diagnosis: TBI  Referring Practitioner: HAYLEY Watson CNP for Dr. Latonya Wiseman    Patient enjoyed spending time with our pet therapy dogs.  Pt enjoyed petting our pet therapy dog May this afternoon in w/c on her way to gym with P.T.-affect bright and social     Electronically signed by: ANGELA FernandezS  Date: 9/20/2017

## 2017-09-20 NOTE — PROGRESS NOTES
order-- Immediate recall: 10/10 indep   Mental manipulation: 8/10 indep, 2/10 self correction       SHORT TERM GOAL #2:  Goal 2: .  Pt will complete complex executive functioning, organization and reasoning tasks with 80% accuracy, no cues to maximize mental flexibility and complex sequential skills. GOAL MET  INTERVENTIONS: High level deductive reasoning task-- 7/12 indep, 4/12 min cues, 1/12 min-mod cues   *pt mixing up numbers of problem with street numbers x2 with need for cues to correct  *decreased high level reasoning at times. HEP-- remainder of map task for deductive reasoning as unable to complete given time constraints  HEP education for discharge-- recommendations for completion of deductive reasoning puzzles, cross word puzzles, word searches, jig saw puzzles, board games for high level reasoning and mathematical computation (i.e. Monopoly, risk, etc), card games and sudoku. Pt verbalized understanding. SHORT TERM GOAL #3:   Goal 3: .  Pt will complete complex alternating/divided and multimodal attention tasks with no more than x2-3 errors/redirections within a 2 minute task to improve complex attention for return to completion of complex IADL tasks at discharge (i.e. Driving, cooking, etc). GOAL MET   INTERVENTIONS: DNT d/t focus on other goals  Previous session: Completion of above deductive reasoning task within multimodal environment (i.e. Television on, door open, staff interruptions for drop off of mail, etc). Pt tolerated with good success to selectively attend to task and complete appropriate alternating attention as needed. Long-term Goals  Timeframe for Long-term Goals: 2 weeks   Goal 1: Pt will demonstrate return to baseline cognitive functioning in order to return to employment and driving tasks upon discharge.   GOAL MET          Communication  Comprehension: 6 - Complex ideas 90% or device (hearing aid/glasses)  Expression: 7 - Patient expresses complex ideas/needs  Social
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
## 2017-09-20 NOTE — PROGRESS NOTES
Pneumonia      Past Surgical History:   Procedure Laterality Date     SECTION      CHOLECYSTECTOMY      FRACTURE SURGERY      right foot    [place] REPAIR EXTEN TENDON,DORSUM HAND,EA Left 2017    LEFT HAND TENDON REPAIR INDEX, MIDDLE, & RING FINGERS performed by Zacarias Mckeon MD at [address] [facility]        Subjective  Subjective: Patient pleasent and cooperative, agreeable to OT     Pain:  Pain Assessment  Patient Currently in Pain: Yes (/10, ribs)    Objective  Overall Cognitive Status: WNL    Balance  Sitting Balance: Modified independent      Functional Mobility  Functional Mobility Comments: Pt propelled w/c from room to rehab gym and back with R UE and L LE. Apparatus Needs: Right;Knee Immobilizer     Exercises  Comment: Completed R UE exercises with #3 weight all joints in all planes. L UE AROM all joints in all planes except wrist and digit flexion/extension 1 set x 15 repetitions. Exercises completed to increase endurance for home mgt tasks and toilet/shower transfers. Activity Tolerance:  Activity Tolerance: Patient Tolerated treatment well    Assessment:  Performance deficits / Impairments: Decreased functional mobility , Decreased endurance, Decreased ADL status, Decreased balance, Decreased strength, Decreased high-level IADLs, Decreased cognition, Decreased safe awareness, Decreased ROM  Prognosis: Good  Discharge Recommendations: Patient would benefit from continued therapy after discharge, Continue to assess pending progress    Patient Education:  Patient Education: HEP review    Equipment Recommendations: Other: Recommned grab next to toilet, Pt checking to see if she can borrow a tub transfer bench from family. Safety:  Safety Devices in place: Yes  Type of devices:  All fall risk precautions in place, Call light within reach, Gait belt, Left in chair    Plan:  Times per week: 5xs week x 90 min , 1 x week x 30 min  Current Treatment Recommendations: Strengthening, Balance Training, Functional Mobility Training, Endurance Training, Safety Education & Training, Self-Care / ADL, Patient/Caregiver Education & Training, Equipment Evaluation, Education, & procurement, Home Management Training, Wheelchair Mobility Training    Goals:  Patient goals : Be able to care for myself as much as possible. Short term goals  Time Frame for Short term goals: 1 week  Short term goal 1: Pt will complete shower transfers with SBA and no cues for safety to improve indep with self care tasks.    Short term goal 2: Pt will complete basic transfers with supervision and no cues for safe setup and technique to increase independence in toileting routine  Short term goal 3: Pt will complete standing tasks x 2 min with supervision and no cues for NWB status to increase independence in toileting tasks  Short term goal 4: Pt will complete basic homemaking tasks with no > Supervision to increase independence in childcare tasks   Long term goals  Time Frame for Long term goals : 2-3 weeks  Long term goal 1: Pt will complete ADL routine with no cues for safe and adapted tech to increase independence in self care tasks  Long term goal 2: Pt will complete 2 step homemaking tasks with no cues for safety, attention to task and adapted tech to increase independence in simple cooking tasks

## 2017-09-21 PROCEDURE — 6370000000 HC RX 637 (ALT 250 FOR IP): Performed by: NURSE PRACTITIONER

## 2017-09-21 PROCEDURE — 97535 SELF CARE MNGMENT TRAINING: CPT

## 2017-09-21 PROCEDURE — 1180000000 HC REHAB R&B

## 2017-09-21 PROCEDURE — L3808 WHFO, RIGID W/O JOINTS: HCPCS

## 2017-09-21 PROCEDURE — 6360000002 HC RX W HCPCS: Performed by: PHYSICAL MEDICINE & REHABILITATION

## 2017-09-21 PROCEDURE — 97116 GAIT TRAINING THERAPY: CPT

## 2017-09-21 PROCEDURE — A6446 CONFORM BAND S W>=3" <5"/YD: HCPCS

## 2017-09-21 PROCEDURE — 97530 THERAPEUTIC ACTIVITIES: CPT

## 2017-09-21 PROCEDURE — 99232 SBSQ HOSP IP/OBS MODERATE 35: CPT | Performed by: PHYSICAL MEDICINE & REHABILITATION

## 2017-09-21 PROCEDURE — 97110 THERAPEUTIC EXERCISES: CPT

## 2017-09-21 PROCEDURE — 6370000000 HC RX 637 (ALT 250 FOR IP): Performed by: PHYSICAL MEDICINE & REHABILITATION

## 2017-09-21 RX ADMIN — DOCUSATE SODIUM 100 MG: 100 CAPSULE ORAL at 21:41

## 2017-09-21 RX ADMIN — HYDROCODONE BITARTRATE AND ACETAMINOPHEN 2 TABLET: 5; 325 TABLET ORAL at 18:49

## 2017-09-21 RX ADMIN — HYDROCODONE BITARTRATE AND ACETAMINOPHEN 2 TABLET: 5; 325 TABLET ORAL at 02:26

## 2017-09-21 RX ADMIN — VENLAFAXINE 75 MG: 37.5 TABLET ORAL at 08:26

## 2017-09-21 RX ADMIN — ENOXAPARIN SODIUM 40 MG: 40 INJECTION SUBCUTANEOUS at 08:26

## 2017-09-21 RX ADMIN — POLYETHYLENE GLYCOL 3350 17 G: 17 POWDER, FOR SOLUTION ORAL at 21:41

## 2017-09-21 RX ADMIN — HYDROCODONE BITARTRATE AND ACETAMINOPHEN 2 TABLET: 5; 325 TABLET ORAL at 07:34

## 2017-09-21 RX ADMIN — FAMOTIDINE 20 MG: 20 TABLET, FILM COATED ORAL at 21:41

## 2017-09-21 RX ADMIN — POLYETHYLENE GLYCOL 3350 17 G: 17 POWDER, FOR SOLUTION ORAL at 08:26

## 2017-09-21 RX ADMIN — TRAZODONE HYDROCHLORIDE 50 MG: 50 TABLET ORAL at 21:41

## 2017-09-21 RX ADMIN — FAMOTIDINE 20 MG: 20 TABLET, FILM COATED ORAL at 08:26

## 2017-09-21 ASSESSMENT — PAIN DESCRIPTION - PAIN TYPE
TYPE: ACUTE PAIN

## 2017-09-21 ASSESSMENT — PAIN SCALES - GENERAL
PAINLEVEL_OUTOF10: 7
PAINLEVEL_OUTOF10: 6
PAINLEVEL_OUTOF10: 6
PAINLEVEL_OUTOF10: 0
PAINLEVEL_OUTOF10: 6
PAINLEVEL_OUTOF10: 5
PAINLEVEL_OUTOF10: 7
PAINLEVEL_OUTOF10: 8

## 2017-09-21 ASSESSMENT — PAIN DESCRIPTION - LOCATION
LOCATION: RIB CAGE
LOCATION: RIB CAGE
LOCATION: HAND;RIB CAGE

## 2017-09-21 ASSESSMENT — PAIN DESCRIPTION - DESCRIPTORS: DESCRIPTORS: SHARP

## 2017-09-21 ASSESSMENT — PAIN DESCRIPTION - ORIENTATION: ORIENTATION: LEFT
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
## 2017-09-21 NOTE — PROGRESS NOTES
5: Pt to get in/out of car, with supervision for transportation needs.   Long term goal 6: Pt to walk with pf RW >= 10 ft, +1 mod I to get in/out of bathroom

## 2017-09-22 PROCEDURE — 97110 THERAPEUTIC EXERCISES: CPT

## 2017-09-22 PROCEDURE — 1180000000 HC REHAB R&B

## 2017-09-22 PROCEDURE — 6370000000 HC RX 637 (ALT 250 FOR IP): Performed by: PHYSICAL MEDICINE & REHABILITATION

## 2017-09-22 PROCEDURE — A6446 CONFORM BAND S W>=3" <5"/YD: HCPCS

## 2017-09-22 PROCEDURE — 97530 THERAPEUTIC ACTIVITIES: CPT

## 2017-09-22 PROCEDURE — 97542 WHEELCHAIR MNGMENT TRAINING: CPT

## 2017-09-22 PROCEDURE — 6370000000 HC RX 637 (ALT 250 FOR IP): Performed by: NURSE PRACTITIONER

## 2017-09-22 PROCEDURE — 99232 SBSQ HOSP IP/OBS MODERATE 35: CPT | Performed by: NURSE PRACTITIONER

## 2017-09-22 PROCEDURE — 97535 SELF CARE MNGMENT TRAINING: CPT

## 2017-09-22 PROCEDURE — 6360000002 HC RX W HCPCS: Performed by: PHYSICAL MEDICINE & REHABILITATION

## 2017-09-22 PROCEDURE — 97116 GAIT TRAINING THERAPY: CPT

## 2017-09-22 RX ADMIN — ENOXAPARIN SODIUM 40 MG: 40 INJECTION SUBCUTANEOUS at 09:24

## 2017-09-22 RX ADMIN — FAMOTIDINE 20 MG: 20 TABLET, FILM COATED ORAL at 21:27

## 2017-09-22 RX ADMIN — HYDROCODONE BITARTRATE AND ACETAMINOPHEN 2 TABLET: 5; 325 TABLET ORAL at 21:17

## 2017-09-22 RX ADMIN — DOCUSATE SODIUM 100 MG: 100 CAPSULE ORAL at 09:24

## 2017-09-22 RX ADMIN — HYDROCODONE BITARTRATE AND ACETAMINOPHEN 2 TABLET: 5; 325 TABLET ORAL at 00:46

## 2017-09-22 RX ADMIN — HYDROCODONE BITARTRATE AND ACETAMINOPHEN 2 TABLET: 5; 325 TABLET ORAL at 09:34

## 2017-09-22 RX ADMIN — TRAZODONE HYDROCHLORIDE 50 MG: 50 TABLET ORAL at 21:14

## 2017-09-22 RX ADMIN — TIZANIDINE 4 MG: 4 TABLET ORAL at 00:48

## 2017-09-22 RX ADMIN — POLYETHYLENE GLYCOL 3350 17 G: 17 POWDER, FOR SOLUTION ORAL at 21:14

## 2017-09-22 RX ADMIN — POLYETHYLENE GLYCOL 3350 17 G: 17 POWDER, FOR SOLUTION ORAL at 09:24

## 2017-09-22 RX ADMIN — VENLAFAXINE 75 MG: 37.5 TABLET ORAL at 09:24

## 2017-09-22 RX ADMIN — TIZANIDINE 4 MG: 4 TABLET ORAL at 22:39

## 2017-09-22 RX ADMIN — TIZANIDINE 4 MG: 4 TABLET ORAL at 09:34

## 2017-09-22 RX ADMIN — FAMOTIDINE 20 MG: 20 TABLET, FILM COATED ORAL at 09:24

## 2017-09-22 RX ADMIN — TIZANIDINE 4 MG: 4 TABLET ORAL at 16:15

## 2017-09-22 RX ADMIN — HYDROCODONE BITARTRATE AND ACETAMINOPHEN 2 TABLET: 5; 325 TABLET ORAL at 16:16

## 2017-09-22 ASSESSMENT — PAIN SCALES - GENERAL
PAINLEVEL_OUTOF10: 0
PAINLEVEL_OUTOF10: 4
PAINLEVEL_OUTOF10: 4
PAINLEVEL_OUTOF10: 7
PAINLEVEL_OUTOF10: 7
PAINLEVEL_OUTOF10: 3
PAINLEVEL_OUTOF10: 8
PAINLEVEL_OUTOF10: 6
PAINLEVEL_OUTOF10: 8
PAINLEVEL_OUTOF10: 6
PAINLEVEL_OUTOF10: 5

## 2017-09-22 ASSESSMENT — PAIN DESCRIPTION - LOCATION
LOCATION: RIB CAGE

## 2017-09-22 ASSESSMENT — PAIN DESCRIPTION - ORIENTATION
ORIENTATION: LEFT
ORIENTATION: LEFT

## 2017-09-22 ASSESSMENT — PAIN DESCRIPTION - PAIN TYPE
TYPE: ACUTE PAIN

## 2017-09-22 ASSESSMENT — PAIN DESCRIPTION - DESCRIPTORS: DESCRIPTORS: ACHING
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
## 2017-09-22 NOTE — PROGRESS NOTES
and technique to increase independence in toileting routine  Short term goal 3: Pt will complete standing tasks x 2 min with supervision and no cues for NWB status to increase independence in toileting tasks  Short term goal 4: Pt will complete basic homemaking tasks with no > Supervision to increase independence in childcare tasks   Long term goals  Time Frame for Long term goals : 2-3 weeks  Long term goal 1: Pt will complete ADL routine with no cues for safe and adapted tech to increase independence in self care tasks  Long term goal 2: Pt will complete 2 step homemaking tasks with no cues for safety, attention to task and adapted tech to increase independence in simple cooking tasks

## 2017-09-22 NOTE — PLAN OF CARE
Problem: Falls - Risk of:  Goal: Will remain free from falls  Will remain free from falls   Outcome: Ongoing  Fall precautions in place. No falls on day shift. Problem: Pain Control  Goal: Maintain pain level at or below patients acceptable level (or 5 if patient is unable to determine acceptable level)  Outcome: Ongoing  Pt taking pain medication to assist with pain. Pt's pain tolerable after pain medication given. Problem: Anxiety:  Goal: Level of anxiety will decrease  Level of anxiety will decrease   Outcome: Ongoing  Calm environment supported. Problem: Skin Integrity:  Goal: Will show no infection signs and symptoms  Will show no infection signs and symptoms   Outcome: Ongoing  No new breakdown noted today. Problem: Bleeding:  Goal: Will show no signs and symptoms of excessive bleeding  Will show no signs and symptoms of excessive bleeding   Outcome: Met This Shift    Problem: Nutrition  Goal: Optimal nutrition therapy  Outcome: Ongoing  Pt eating well by self. Comments:   Care plan reviewed with patient and family. Patient and family verbalize understanding of the plan of care and contribute to goal setting.

## 2017-09-22 NOTE — PLAN OF CARE
Problem: Falls - Risk of:  Goal: Will remain free from falls  Will remain free from falls   Outcome: Ongoing  Free from falls at this time. Fall band, sign, and nonskid footwear in place. Problem: Anxiety:  Goal: Level of anxiety will decrease  Level of anxiety will decrease   Outcome: Ongoing  Calm and cooperative with care. Problem: Bleeding:  Goal: Will show no signs and symptoms of excessive bleeding  Will show no signs and symptoms of excessive bleeding   Outcome: Ongoing  No s/s noted at this time. Will continue to monitor. Problem: Risk for Impaired Skin Integrity  Goal: Tissue integrity - skin and mucous membranes  Structural intactness and normal physiological function of skin and  mucous membranes. Outcome: Ongoing  No new skin issues noted at this time. Repositioned frequently. See flow sheet for findings. Problem: Nutrition  Goal: Optimal nutrition therapy  Outcome: Ongoing  Appetite appropriate, fluids encouraged.
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
## 2017-09-23 LAB
ANION GAP SERPL CALCULATED.3IONS-SCNC: 12 MEQ/L (ref 8–16)
ANISOCYTOSIS: ABNORMAL
BASOPHILS # BLD: 2.5 %
BASOPHILS ABSOLUTE: 0.1 THOU/MM3 (ref 0–0.1)
BUN BLDV-MCNC: 17 MG/DL (ref 7–22)
CALCIUM SERPL-MCNC: 9.8 MG/DL (ref 8.5–10.5)
CHLORIDE BLD-SCNC: 101 MEQ/L (ref 98–111)
CO2: 28 MEQ/L (ref 23–33)
CREAT SERPL-MCNC: 0.7 MG/DL (ref 0.4–1.2)
EOSINOPHIL # BLD: 3.5 %
EOSINOPHILS ABSOLUTE: 0.2 THOU/MM3 (ref 0–0.4)
GFR SERPL CREATININE-BSD FRML MDRD: > 90 ML/MIN/1.73M2
GLUCOSE BLD-MCNC: 92 MG/DL (ref 70–108)
HCT VFR BLD CALC: 34.2 % (ref 37–47)
HEMOGLOBIN: 11.4 GM/DL (ref 12–16)
LYMPHOCYTES # BLD: 21.6 %
LYMPHOCYTES ABSOLUTE: 1.3 THOU/MM3 (ref 1–4.8)
MCH RBC QN AUTO: 29.9 PG (ref 27–31)
MCHC RBC AUTO-ENTMCNC: 33.3 GM/DL (ref 33–37)
MCV RBC AUTO: 89.9 FL (ref 81–99)
MONOCYTES # BLD: 8.7 %
MONOCYTES ABSOLUTE: 0.5 THOU/MM3 (ref 0.4–1.3)
NUCLEATED RED BLOOD CELLS: 0 /100 WBC
PDW BLD-RTO: 15.8 % (ref 11.5–14.5)
PLATELET # BLD: 327 THOU/MM3 (ref 130–400)
PMV BLD AUTO: 7.9 MCM (ref 7.4–10.4)
POTASSIUM SERPL-SCNC: 4.6 MEQ/L (ref 3.5–5.2)
RBC # BLD: 3.8 MILL/MM3 (ref 4.2–5.4)
RBC # BLD: NORMAL 10*6/UL
SEG NEUTROPHILS: 63.7 %
SEGMENTED NEUTROPHILS ABSOLUTE COUNT: 3.7 THOU/MM3 (ref 1.8–7.7)
SODIUM BLD-SCNC: 141 MEQ/L (ref 135–145)
TROPONIN T: < 0.01 NG/ML
WBC # BLD: 5.8 THOU/MM3 (ref 4.8–10.8)

## 2017-09-23 PROCEDURE — 6370000000 HC RX 637 (ALT 250 FOR IP): Performed by: PHYSICAL MEDICINE & REHABILITATION

## 2017-09-23 PROCEDURE — 97530 THERAPEUTIC ACTIVITIES: CPT

## 2017-09-23 PROCEDURE — 80048 BASIC METABOLIC PNL TOTAL CA: CPT

## 2017-09-23 PROCEDURE — 1180000000 HC REHAB R&B

## 2017-09-23 PROCEDURE — 97535 SELF CARE MNGMENT TRAINING: CPT

## 2017-09-23 PROCEDURE — 85025 COMPLETE CBC W/AUTO DIFF WBC: CPT

## 2017-09-23 PROCEDURE — 6360000002 HC RX W HCPCS: Performed by: PHYSICAL MEDICINE & REHABILITATION

## 2017-09-23 PROCEDURE — 84484 ASSAY OF TROPONIN QUANT: CPT

## 2017-09-23 PROCEDURE — 6370000000 HC RX 637 (ALT 250 FOR IP): Performed by: NURSE PRACTITIONER

## 2017-09-23 PROCEDURE — 93005 ELECTROCARDIOGRAM TRACING: CPT | Performed by: PHYSICAL MEDICINE & REHABILITATION

## 2017-09-23 PROCEDURE — 36415 COLL VENOUS BLD VENIPUNCTURE: CPT

## 2017-09-23 RX ADMIN — DOCUSATE SODIUM 100 MG: 100 CAPSULE ORAL at 09:11

## 2017-09-23 RX ADMIN — HYDROCODONE BITARTRATE AND ACETAMINOPHEN 2 TABLET: 5; 325 TABLET ORAL at 06:21

## 2017-09-23 RX ADMIN — VENLAFAXINE 75 MG: 37.5 TABLET ORAL at 09:11

## 2017-09-23 RX ADMIN — HYDROCODONE BITARTRATE AND ACETAMINOPHEN 2 TABLET: 5; 325 TABLET ORAL at 11:09

## 2017-09-23 RX ADMIN — POLYETHYLENE GLYCOL 3350 17 G: 17 POWDER, FOR SOLUTION ORAL at 21:05

## 2017-09-23 RX ADMIN — TIZANIDINE 4 MG: 4 TABLET ORAL at 16:24

## 2017-09-23 RX ADMIN — POLYETHYLENE GLYCOL 3350 17 G: 17 POWDER, FOR SOLUTION ORAL at 09:11

## 2017-09-23 RX ADMIN — FAMOTIDINE 20 MG: 20 TABLET, FILM COATED ORAL at 09:11

## 2017-09-23 RX ADMIN — TRAZODONE HYDROCHLORIDE 50 MG: 50 TABLET ORAL at 21:05

## 2017-09-23 RX ADMIN — ENOXAPARIN SODIUM 40 MG: 40 INJECTION SUBCUTANEOUS at 09:11

## 2017-09-23 RX ADMIN — FAMOTIDINE 20 MG: 20 TABLET, FILM COATED ORAL at 21:05

## 2017-09-23 RX ADMIN — TIZANIDINE 4 MG: 4 TABLET ORAL at 06:20

## 2017-09-23 RX ADMIN — HYDROCODONE BITARTRATE AND ACETAMINOPHEN 1 TABLET: 5; 325 TABLET ORAL at 16:24

## 2017-09-23 ASSESSMENT — PAIN SCALES - GENERAL
PAINLEVEL_OUTOF10: 1
PAINLEVEL_OUTOF10: 7
PAINLEVEL_OUTOF10: 5
PAINLEVEL_OUTOF10: 7
PAINLEVEL_OUTOF10: 7
PAINLEVEL_OUTOF10: 3
PAINLEVEL_OUTOF10: 5
PAINLEVEL_OUTOF10: 0
PAINLEVEL_OUTOF10: 1

## 2017-09-23 ASSESSMENT — PAIN DESCRIPTION - ORIENTATION
ORIENTATION: LEFT

## 2017-09-23 ASSESSMENT — PAIN DESCRIPTION - LOCATION
LOCATION: RIB CAGE

## 2017-09-23 ASSESSMENT — PAIN DESCRIPTION - PAIN TYPE
TYPE: ACUTE PAIN

## 2017-09-23 ASSESSMENT — PAIN DESCRIPTION - DESCRIPTORS
DESCRIPTORS: ACHING
DESCRIPTORS: ACHING
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
## 2017-09-24 PROCEDURE — 1180000000 HC REHAB R&B

## 2017-09-24 PROCEDURE — 6360000002 HC RX W HCPCS: Performed by: PHYSICAL MEDICINE & REHABILITATION

## 2017-09-24 PROCEDURE — 6370000000 HC RX 637 (ALT 250 FOR IP): Performed by: NURSE PRACTITIONER

## 2017-09-24 PROCEDURE — 6370000000 HC RX 637 (ALT 250 FOR IP): Performed by: PHYSICAL MEDICINE & REHABILITATION

## 2017-09-24 RX ADMIN — FAMOTIDINE 20 MG: 20 TABLET, FILM COATED ORAL at 20:59

## 2017-09-24 RX ADMIN — VENLAFAXINE 75 MG: 37.5 TABLET ORAL at 10:08

## 2017-09-24 RX ADMIN — HYDROCODONE BITARTRATE AND ACETAMINOPHEN 1 TABLET: 5; 325 TABLET ORAL at 10:08

## 2017-09-24 RX ADMIN — TRAZODONE HYDROCHLORIDE 50 MG: 50 TABLET ORAL at 20:59

## 2017-09-24 RX ADMIN — TIZANIDINE 4 MG: 4 TABLET ORAL at 21:01

## 2017-09-24 RX ADMIN — FAMOTIDINE 20 MG: 20 TABLET, FILM COATED ORAL at 10:08

## 2017-09-24 RX ADMIN — ENOXAPARIN SODIUM 40 MG: 40 INJECTION SUBCUTANEOUS at 10:09

## 2017-09-24 RX ADMIN — HYDROCODONE BITARTRATE AND ACETAMINOPHEN 2 TABLET: 5; 325 TABLET ORAL at 21:00

## 2017-09-24 RX ADMIN — TIZANIDINE 4 MG: 4 TABLET ORAL at 03:54

## 2017-09-24 RX ADMIN — POLYETHYLENE GLYCOL 3350 17 G: 17 POWDER, FOR SOLUTION ORAL at 21:00

## 2017-09-24 RX ADMIN — HYDROCODONE BITARTRATE AND ACETAMINOPHEN 2 TABLET: 5; 325 TABLET ORAL at 03:54

## 2017-09-24 ASSESSMENT — PAIN DESCRIPTION - PAIN TYPE
TYPE: ACUTE PAIN

## 2017-09-24 ASSESSMENT — PAIN DESCRIPTION - ORIENTATION
ORIENTATION: LEFT

## 2017-09-24 ASSESSMENT — PAIN SCALES - GENERAL
PAINLEVEL_OUTOF10: 7
PAINLEVEL_OUTOF10: 4
PAINLEVEL_OUTOF10: 7
PAINLEVEL_OUTOF10: 5
PAINLEVEL_OUTOF10: 8
PAINLEVEL_OUTOF10: 0

## 2017-09-24 ASSESSMENT — PAIN DESCRIPTION - LOCATION
LOCATION: RIB CAGE

## 2017-09-24 ASSESSMENT — PAIN DESCRIPTION - DESCRIPTORS
DESCRIPTORS: ACHING
DESCRIPTORS: ACHING

## 2017-09-24 NOTE — PLAN OF CARE
Problem: Falls - Risk of:  Goal: Will remain free from falls  Will remain free from falls   Outcome: Ongoing  Fall precautions in place. No falls on day shift. Problem: Pain Control  Goal: Maintain pain level at or below patients acceptable level (or 5 if patient is unable to determine acceptable level)  Outcome: Ongoing  Pt taking Norco to assist with pain. Pt reports pain tolerable after Norco given. Problem: Anxiety:  Goal: Level of anxiety will decrease  Level of anxiety will decrease   Outcome: Ongoing  Calm environment supported. Pt has not appeared to be in any distress. Problem: Skin Integrity:  Goal: Absence of new skin breakdown  Absence of new skin breakdown   Outcome: Ongoing  Continues to have redness behind left knee. Problem: Bleeding:  Goal: Will show no signs and symptoms of excessive bleeding  Will show no signs and symptoms of excessive bleeding   Outcome: Met This Shift  No excessive bleeding noted. Problem: Mobility - Impaired:  Goal: Mobility will improve  Mobility will improve   Outcome: Ongoing  Pt encouraged to do what she can by self. Problem: Nutrition  Goal: Optimal nutrition therapy  Outcome: Ongoing  Pt eating well by self. Comments:   Care plan reviewed with patient. Patient verbalizes understanding of the plan of care and contributes to goal setting.

## 2017-09-25 PROCEDURE — 99232 SBSQ HOSP IP/OBS MODERATE 35: CPT | Performed by: NURSE PRACTITIONER

## 2017-09-25 PROCEDURE — 97530 THERAPEUTIC ACTIVITIES: CPT

## 2017-09-25 PROCEDURE — 6360000002 HC RX W HCPCS: Performed by: PHYSICAL MEDICINE & REHABILITATION

## 2017-09-25 PROCEDURE — 97535 SELF CARE MNGMENT TRAINING: CPT

## 2017-09-25 PROCEDURE — A6446 CONFORM BAND S W>=3" <5"/YD: HCPCS

## 2017-09-25 PROCEDURE — 97542 WHEELCHAIR MNGMENT TRAINING: CPT

## 2017-09-25 PROCEDURE — 6370000000 HC RX 637 (ALT 250 FOR IP): Performed by: NURSE PRACTITIONER

## 2017-09-25 PROCEDURE — 6370000000 HC RX 637 (ALT 250 FOR IP): Performed by: PHYSICAL MEDICINE & REHABILITATION

## 2017-09-25 PROCEDURE — 1180000000 HC REHAB R&B

## 2017-09-25 PROCEDURE — 97110 THERAPEUTIC EXERCISES: CPT

## 2017-09-25 RX ORDER — POLYETHYLENE GLYCOL 3350 17 G/17G
17 POWDER, FOR SOLUTION ORAL 2 TIMES DAILY
Qty: 527 G | Refills: 1 | Status: SHIPPED | OUTPATIENT
Start: 2017-09-25 | End: 2017-10-25

## 2017-09-25 RX ORDER — ACETAMINOPHEN 325 MG/1
650 TABLET ORAL EVERY 4 HOURS PRN
COMMUNITY
Start: 2017-09-25

## 2017-09-25 RX ORDER — TIZANIDINE 4 MG/1
4 TABLET ORAL EVERY 6 HOURS PRN
Qty: 90 TABLET | Refills: 0 | Status: SHIPPED | OUTPATIENT
Start: 2017-09-25 | End: 2017-10-19 | Stop reason: SDUPTHER

## 2017-09-25 RX ORDER — SENNA PLUS 8.6 MG/1
2 TABLET ORAL NIGHTLY
Qty: 30 TABLET | Refills: 0 | Status: SHIPPED | OUTPATIENT
Start: 2017-09-25 | End: 2017-10-19

## 2017-09-25 RX ORDER — PSEUDOEPHEDRINE HCL 30 MG
100 TABLET ORAL 2 TIMES DAILY
Qty: 60 CAPSULE | Refills: 0 | Status: SHIPPED | OUTPATIENT
Start: 2017-09-25 | End: 2017-10-19

## 2017-09-25 RX ORDER — HYDROCODONE BITARTRATE AND ACETAMINOPHEN 5; 325 MG/1; MG/1
1 TABLET ORAL EVERY 4 HOURS PRN
Qty: 42 TABLET | Refills: 0 | Status: SHIPPED | OUTPATIENT
Start: 2017-09-25 | End: 2017-10-02

## 2017-09-25 RX ORDER — TRAZODONE HYDROCHLORIDE 50 MG/1
50 TABLET ORAL NIGHTLY PRN
Qty: 30 TABLET | Refills: 0 | Status: SHIPPED | OUTPATIENT
Start: 2017-09-25 | End: 2017-10-19 | Stop reason: SDUPTHER

## 2017-09-25 RX ADMIN — HYDROCODONE BITARTRATE AND ACETAMINOPHEN 2 TABLET: 5; 325 TABLET ORAL at 01:46

## 2017-09-25 RX ADMIN — TIZANIDINE 4 MG: 4 TABLET ORAL at 21:00

## 2017-09-25 RX ADMIN — POLYETHYLENE GLYCOL 3350 17 G: 17 POWDER, FOR SOLUTION ORAL at 21:01

## 2017-09-25 RX ADMIN — HYDROCODONE BITARTRATE AND ACETAMINOPHEN 2 TABLET: 5; 325 TABLET ORAL at 20:59

## 2017-09-25 RX ADMIN — FAMOTIDINE 20 MG: 20 TABLET, FILM COATED ORAL at 07:57

## 2017-09-25 RX ADMIN — DOCUSATE SODIUM 100 MG: 100 CAPSULE ORAL at 07:57

## 2017-09-25 RX ADMIN — HYDROCODONE BITARTRATE AND ACETAMINOPHEN 1 TABLET: 5; 325 TABLET ORAL at 16:50

## 2017-09-25 RX ADMIN — TRAZODONE HYDROCHLORIDE 50 MG: 50 TABLET ORAL at 20:59

## 2017-09-25 RX ADMIN — HYDROCODONE BITARTRATE AND ACETAMINOPHEN 2 TABLET: 5; 325 TABLET ORAL at 07:57

## 2017-09-25 RX ADMIN — ENOXAPARIN SODIUM 40 MG: 40 INJECTION SUBCUTANEOUS at 07:57

## 2017-09-25 RX ADMIN — VENLAFAXINE 75 MG: 37.5 TABLET ORAL at 07:57

## 2017-09-25 RX ADMIN — FAMOTIDINE 20 MG: 20 TABLET, FILM COATED ORAL at 20:59

## 2017-09-25 ASSESSMENT — PAIN SCALES - GENERAL
PAINLEVEL_OUTOF10: 7
PAINLEVEL_OUTOF10: 6
PAINLEVEL_OUTOF10: 6
PAINLEVEL_OUTOF10: 0
PAINLEVEL_OUTOF10: 6
PAINLEVEL_OUTOF10: 8
PAINLEVEL_OUTOF10: 6
PAINLEVEL_OUTOF10: 6

## 2017-09-25 ASSESSMENT — PAIN DESCRIPTION - DESCRIPTORS
DESCRIPTORS: ACHING

## 2017-09-25 ASSESSMENT — PAIN DESCRIPTION - LOCATION
LOCATION: RIB CAGE

## 2017-09-25 ASSESSMENT — PAIN DESCRIPTION - PAIN TYPE
TYPE: ACUTE PAIN

## 2017-09-25 ASSESSMENT — PAIN DESCRIPTION - ORIENTATION
ORIENTATION: LEFT

## 2017-09-25 NOTE — PROGRESS NOTES
supine supervision to get in and out of bed-MET, SEE LTG  Short term goal 2: Pt to transfer sit <-> stand with platform RW supervision to get in and out of bed/chairs, maintaining NWB RLE-NOT MET   Short term goal 3: Pt to perform stand/pivot transfer +1 supervision assist, maintaining NWB RLE, to get in/out of w/c. NOT MET   Short term goal 4: Pt to propel w/c >= 100 ft, indoor surfaces, gravel surface, and uneven surface with supervision for increased mobility. MET, SEE LTG  Short term goal 5: Pt to ascend/descend 25ft ramp,+1 supervision assist to get in/out of house MET, SEE LTG   Short term goal 6: Pt to walk with pf RW >= 10 ft, +1 supervision to get in/out of bathroom NOT MET     Long term goals  Time Frame for Long term goals : 3 wks  Long term goal 1: Pt to sit <-> supine Mod I to get in and out of bed -GOAL MET   Long term goal 2: Pt to transfer sit <-> stand with platform RW , Mod I, to get in and out of bed/chairs, maintaining NWB RLE  NOT MET   Long term goal 3: Pt to propel w/c >= 150 ft, indoor surfaces, gravel surface, and uneven surface with mod I for increased mobility. NOT MET   Long term goal 4: Pt to perform stand/pivot transfer Mod I, maintaining NWB RLE, to get in/out of w/c. NOT MET   Long term goal 5: Pt to get in/out of car, with supervision for transportation needs.  NOT MET   Long term goal 6: Pt to walk with pf RW >= 10 ft, +1 mod I to get in/out of bathroom NOT MET

## 2017-09-25 NOTE — PROGRESS NOTES
bedroom/bathroom  Home Access: Stairs to enter with rails, Ramped entrance (Family installing ramp for use at discharge)  Entrance Stairs - Number of Steps: 3  Entrance Stairs - Rails: None  Home Equipment: Rolling walker     Objective:  Rolling to Left: Supervision (on flat mat, head elevated on wedge for comfort, cues for sequencing)  Rolling to Right: Modified independent (flat mat, no rail)  Supine to Sit: Modified independent  Sit to Supine: Modified independent  Scooting: Modified independent    Transfers  Sit to Stand: Contact guard assistance  Stand to sit: Contact guard assistance     Ambulation 1  Surface: level tile  Device: Rolling Walker;Platform Walker left  Other Apparatus: Knee Immobilizer;Right  Assistance: Contact guard assistance  Quality of Gait: Decreased alireza, decreased foot clearance on LLE, fatigued quickly. Distance: 10 ft x1     Propulsion 1  Propulsion: Manual  Level: Level tile   Method: RUE;LLE  Level of Assistance: Modified independent  Description/ Details: Good propulsion velocity on level tile, navigated through cones placed on floor, knocking over 1 cone but had good navigation otherwise. Distance: 300 ft x1; 150 ft x1     Exercises:  Exercises  Comments: Completed seated HEP of Dg LE heel/toe raises, LLE long arc quads and marches x15. RLE quad sets x15 reps each. All for increased strength/endurance for improved functional mobility. Activity Tolerance:  Activity Tolerance: Patient Tolerated treatment well    Assessment: Body structures, Functions, Activity limitations: Decreased functional mobility , Decreased ROM, Decreased strength, Decreased endurance, Decreased balance  Assessment: Patient tolerated session well, limited by decreased endurance overall. Demonstrated good and safe techniques and able to maintain NWB on RLE and LUE throughout.     Prognosis: Good  Discharge Recommendations: Patient would benefit from continued therapy after discharge, Continue to assess pending progress    Patient Education:  Patient Education: HEP, WC mobiltiy, transfers, gait training  Barriers to Learning: None    Equipment Recommendations:  Equipment Needed: Yes  Mobility Devices: Wheelchair, Christen Shield: Rolling, Platform Left  Wheelchair: Standard    Safety:  Type of devices: All fall risk precautions in place, Call light within reach, Gait belt, Patient at risk for falls, Left in chair    Plan:  Times per week: 5x/ wk 90 min, 1x/ wk 30 min  Specific instructions for Next Treatment: therex, w/c mobility, transfers, gait, car transfer  Current Treatment Recommendations: Strengthening, ROM, Balance Training, Functional Mobility Training, Transfer Training, Endurance Training, Wheelchair Mobility Training, Equipment Evaluation, Education, & procurement, Patient/Caregiver Education & Training, Safety Education & Training    Goals:  Patient goals : Get moving on my own better    Short term goals  Time Frame for Short term goals: 1 wk  Short term goal 1: Pt to sit <-> supine supervision to get in and out of bed  Short term goal 2: Pt to transfer sit <-> stand with platform RW supervision to get in and out of bed/chairs, maintaining NWB RLE   Short term goal 3: Pt to perform stand/pivot transfer +1 supervision assist, maintaining NWB RLE, to get in/out of w/c. Short term goal 4: Pt to propel w/c >= 100 ft, indoor surfaces, gravel surface, and uneven surface with supervision for increased mobility.   Short term goal 5: Pt to ascend/descend 25ft ramp,+1 supervision assist to get in/out of house  Short term goal 6: Pt to walk with pf RW >= 10 ft, +1 supervision to get in/out of bathroom    Long term goals  Time Frame for Long term goals : 3 wks  Long term goal 1: Pt to sit <-> supine Mod I to get in and out of bed  Long term goal 2: Pt to transfer sit <-> stand with platform RW , Mod I, to get in and out of bed/chairs, maintaining NWB RLE   Long term goal 3: Pt to propel w/c >= 150 ft, indoor surfaces, gravel surface, and uneven surface with mod I for increased mobility. Long term goal 4: Pt to perform stand/pivot transfer Mod I, maintaining NWB RLE, to get in/out of w/c. Long term goal 5: Pt to get in/out of car, with supervision for transportation needs.   Long term goal 6: Pt to walk with pf RW >= 10 ft, +1 mod I to get in/out of bathroom

## 2017-09-25 NOTE — PROGRESS NOTES
left lengthy phone message update on patient with patient's , Ge Goel, per request of patient's insurance, [employer]. Waiting for Ms. Graft's response to approved stay. The discharge plan for 9/26 was provided.

## 2017-09-25 NOTE — PLAN OF CARE
Problem: Falls - Risk of:  Goal: Will remain free from falls  Will remain free from falls   Outcome: Ongoing  Fall protocol initiated. Yellow arm band on arm. Problem: Pain Control  Goal: Maintain pain level at or below patients acceptable level (or 5 if patient is unable to determine acceptable level)  Outcome: Ongoing  Patient informs staff of onset of pain. No new skin breakdown noted. Problem: Anxiety:  Goal: Level of anxiety will decrease  Level of anxiety will decrease   Outcome: Ongoing  Patient in calm environment. Problem: Activity:  Goal: Sleeping patterns will improve  Sleeping patterns will improve   Outcome: Ongoing  Patient stated they slept well     Problem: Skin Integrity:  Goal: Will show no infection signs and symptoms  Will show no infection signs and symptoms   Outcome: Ongoing  No sign or symptom of infection noted  Goal: Absence of new skin breakdown  Absence of new skin breakdown   Outcome: Ongoing  No new skin breakdown noted    Problem: Bleeding:  Goal: Will show no signs and symptoms of excessive bleeding  Will show no signs and symptoms of excessive bleeding   Outcome: Ongoing  No sign or symptom of excessive bleeding noted    Problem: Risk for Impaired Skin Integrity  Goal: Tissue integrity - skin and mucous membranes  Structural intactness and normal physiological function of skin and  mucous membranes. Outcome: Ongoing  Assessment completed each shift. Patient repositions self. Problem: DISCHARGE BARRIERS  Goal: Patients continuum of care needs are met  Outcome: Ongoing  Continuum care needs met this shift     Problem: Mobility - Impaired:  Goal: Mobility will improve  Mobility will improve   Outcome: Ongoing  Patient working with therapy to improve mobility     Problem: Nutrition  Goal: Optimal nutrition therapy  Outcome: Ongoing  Adequate nutrition     Comments:   Care plan reviewed with patient.  Patient verbalize understanding of the plan of care and contribute to goal setting.

## 2017-09-25 NOTE — PROGRESS NOTES
 Pneumonia      Past Surgical History:   Procedure Laterality Date     SECTION      CHOLECYSTECTOMY      FRACTURE SURGERY      right foot    [place] REPAIR EXTEN TENDON,DORSUM HAND,EA Left 2017    LEFT HAND TENDON REPAIR INDEX, MIDDLE, & RING FINGERS performed by Giuliano Jacques MD at [address] [facility]             Subjective   Pt pleasant and cooperative. Pain:    Pt rates pain as better and is in her ribs. Pt not asked to rate pain and was able to participate fully in OT session without complaints of pain. Objective  Instrumental ADLs: Yes  Meal Prep  Meal Prep Level: Wheelchair  Meal Prep Level of Assistance: Modified independent  Meal Preparation: Patient completed simple IADL tasks from wheelchair level to prepare meal on stove top. Pt maneuvered wheelchair around obstacles, problem solve and managed leg rests without cues and successfully prepared noodles on stove with no cues for safety. OT educated on adaptive techniques, such as use of a mirror to monitor cooking with pt verbalizing understanding. Also discussed arranging kitchen to fit her current needs. Pt states she feels prepared to cook for family at home. Balance  Sitting Balance: Modified independent     Functional Mobility  Wheelchair  Assist Level: Mod I  To and from rehab apartment   Apparatus Needs: Knee Immobilizer;Right (LSO )     Activity Tolerance:  Activity Tolerance: Patient Tolerated treatment well    Assessment:    Performance deficits / Impairments: Decreased functional mobility , Decreased endurance, Decreased ADL status, Decreased balance, Decreased strength, Decreased high-level IADLs, Decreased cognition, Decreased safe awareness, Decreased ROM  Prognosis: Good  Discharge Recommendations: Home with assist PRN    Patient Education:  Patient Education: ADLS, elastic laces     Equipment Recommendations:   Other: Recommned grab next to toilet, Pt checking to see if she can borrow a

## 2017-09-25 NOTE — PROGRESS NOTES
Past Surgical History:   Procedure Laterality Date     SECTION      CHOLECYSTECTOMY      FRACTURE SURGERY      right foot    [place] REPAIR EXTEN TENDON,DORSUM HAND,EA Left 2017    LEFT HAND TENDON REPAIR INDEX, MIDDLE, & RING FINGERS performed by Kevin Madrid MD at [address] [facility]             Subjective  Subjective: Pt pleasant and cooperative. Pt's present for BADL session to improve comfort level and knowledge of changing C-collar after shower. Pain:  Pain Assessment  Patient Currently in Pain: Yes  Pain Level: 6  Pain Type: Acute pain  Pain Location: Rib cage  Pain Orientation: Left    Objective  Overall Cognitive Status: WNL    Bed mobility  Rolling to Left: Supervision (log roll )  Supine to Sit: Minimal assistance (HOB flat and log roll from L side. Able to maintaing NWBing LUE )  Sit to Supine: Modified independent (HOB elevated slightly )  Scooting: Modified independent    Transfers  Stand Pivot Transfers: Contact guard assistance (Following education, mother demonstrated understanding and assisted daughter providing CGA )  Sit to stand: Stand by assistance  Stand to sit: Stand by assistance     Balance  Sitting Balance: Modified independent   Standing Balance: Stand by assistance     Time: 1 min wash hands and 1 min for clothing managment      Functional Mobility  Functional - Mobility Device: Platform walker  Activity: To/from bathroom  Assist Level: Stand by assistance  Functional Mobility Comments:  Patient able to maintain NWB with no cues.   Apparatus Needs: Knee Immobilizer;Right (LSO )        Eatin - Patient feeds self  Groomin - Patient independent with all grooming tasks (seated in wheelchair to brush teeth, comb hair, )  Bathin - Able to bathe 8-9 areas (A to thoroughly dry bottom )  Dressing-Upper: 6 - Independent with device/prosthesis (donned bra and dress )  Dressing-Lower: 4 - Requires assist with buttons/zippers/shoelaces and/or week. Goals set for supervision and patient is at SBA to occasional CGA for safety during transfers. Pt is able to retrieve ADL items from w/c level at Mod I, needs assistance for doffing/donning R sock. Pt is able to complete w/c tasks from w/c level with supervision to problem solve w/c mgt in new scenarios. Family has participated in family education. Prognosis: Good  Discharge Recommendations: Home with assist PRN     Patient Education:  Patient Education: set up of shower chair, equipment for home,     Equipment Recommendations: Other: Recommned grab next to toilet, Pt can borrow tub transfer bench from family, family installing grab bars in shower and ordered toilet safety frame. Safety:  Safety Devices in place: Yes  Type of devices: All fall risk precautions in place, Call light within reach, Gait belt, Left in chair, Nurse notified    Plan:  Times per week: 5xs week x 90 min , 1 x week x 30 min  Current Treatment Recommendations: Strengthening, Balance Training, Functional Mobility Training, Endurance Training, Safety Education & Training, Self-Care / ADL, Patient/Caregiver Education & Training, Equipment Evaluation, Education, & procurement, Home Management Training, Wheelchair Mobility Training    Goals:  Patient goals : Be able to care for myself as much as possible. Short term goals  Time Frame for Short term goals: 1 week  Short term goal 1: Pt will complete shower transfers with SBA and no cues for safety to improve indep with self care tasks. GOAL MET, DISCONTINUE   Short term goal 2: Pt will complete basic transfers with supervision and no cues for safe setup and technique to increase independence in toileting routine.  GOAL NOT MET, CONTINUE   Short term goal 3: Pt will complete standing tasks x 2 min with supervision and no cues for NWB status to increase independence in toileting tasks GOAL NOT MET ,CONTINUE   Short term goal 4: Pt will complete basic homemaking tasks with no >
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
## 2017-09-25 NOTE — DISCHARGE SUMMARY
Assisted with endotracheal intubation for respiratory failure/airway protection. A 7.5fr ETT was placed and secured at 25cm @ lip. Positive color change noted with CO2 detector, breath sounds were coarse t/o, clearing after sxn. Pt sxn'd for a small amt of bloody secretions. Patient placed on full vent support CMV 12 450 .60 peep 8, labs and CXR pending.    Daryl Bonner, RT  7/8/2017 9:35 AM   Physical Medicine & Rehabilitation   Discharge Summary     Patient Identification:  Latonya Jamison  : 1980  Admit date: 2017  Discharge date: 17   Attending provider: Shelvy Castleman, MD        Primary care provider: Margarita Livingston MD     Discharge Diagnoses:   Primary impairment requiring rehabilitation: 14.2 Brain + Multiple Fracture/ Amputation     Etiologic Diagnosis that led to the condition: TBI with brief loss of consciousness  Right tibial plateau fracture, T3-L2 spinous process fractures in TLSO     Comorbid conditions affecting rehabilitation:  · TBI with brief loss of consciousness  · Left rib fractures, 3-9  · Manubrium fracture with mediastinal hematoma  · Right tibial plateau fracture  · C6 closed displaced spinous process fracture in brace  · L2-L5 spinous process fractures in TLSO  · Right hand extensor tendon injury s/p repair  · Constipation   · Anxiety     Discharge Functional Status:    Physical therapy:  Bed Mobility: Scooting: Modified independent  Transfers: Sit to Stand: Contact guard assistance  Stand to sit: Contact guard assistance  Bed to Chair: Minimal assistance (via stand pivot transfer without use of assistive device, and CGA with flatform walker)  Stand Pivot Transfers: Contact guard assistance (using platform RW; WC->mat), Ambulation 1  Surface: level tile  Device: Rolling Walker, Platform Walker left  Other Apparatus: Knee Immobilizer, Right  Assistance: Contact guard assistance  Quality of Gait: Decreased alireza, decreased foot clearance on LLE, fatigued quickly.    Distance: 10 ft x1   Comments: the patient was provided with verbal cues for safe technique and for improved upright posture, the pt demonstrates good carry over as min verbal cues are required for weight bearing status,    Mobility: Bed, Chair, Wheel Chair: 4 - Requires steadying assistance only <25% assist  and/or requires assist with one leg only  Walk: 1- Total Assistance (Subject less than and bowel programs were successful. DVT prophylaxis was maintained with Lovenox. GI prophylaxis was maintained with pepcid during her hospital stay. Patient's pain was well controlled with PRN medication, topical patches, and positioning. patient was sent home with Norco and lidocaine gel. Patient had a uneventful stay on inpatient rehab and progressed well. Patient was discharged home with family and outpatient therapies.         Consults:   neuropsychology    Significant Diagnostics:   CBC:   Lab Results   Component Value Date    WBC 5.8 2017    RBC 3.80 2017    HGB 11.4 2017    HCT 34.2 2017    MCV 89.9 2017    MCH 29.9 2017    MCHC 33.3 2017    RDW 15.8 2017     2017    MPV 7.9 2017     BMP:    Lab Results   Component Value Date     2017    K 4.6 2017     2017    CO2 28 2017    BUN 17 2017    CREATININE 0.7 2017    CALCIUM 9.8 2017    LABGLOM >90 2017    GLUCOSE 92 2017     BUN/Creatinine:    Lab Results   Component Value Date    BUN 17 2017    CREATININE 0.7 2017     Calcium:    Lab Results   Component Value Date    CALCIUM 9.8 2017       Patient Instructions:    Home  Therapy orders: PT and OT   Discharge lab work: none  Code status: Full Code   Activity: activity as tolerated and no driving while on analgesics  Diet: DIET GENERAL;  Dietary Nutrition Supplements: Standard High Calorie Oral Supplement    Wound Care: keep wound clean and dry and as directed  Equipment: wheelchair and walker     Follow-up visits: See after visit summary from hospitalization    Discharge Medications:   Linda Williamson   Home Medication Instructions XA    Printed on:17 2830   Medication Information                      acetaminophen (TYLENOL) 325 MG tablet  Take 2 tablets by mouth every 4 hours as needed for Pain Maximum dose of acetaminophen is 4000 mg from all sources in 24 hours. docusate sodium (COLACE, DULCOLAX) 100 MG CAPS  Take 100 mg by mouth 2 times daily             HYDROcodone-acetaminophen (NORCO) 5-325 MG per tablet  Take 1 tablet by mouth every 4 hours as needed for Pain . Lidocaine 4 % GEL  Apply 1 applicator topically 4 times daily as needed (rib pain)             polyethylene glycol (GLYCOLAX) packet  Take 17 g by mouth 2 times daily             senna (SENOKOT) 8.6 MG tablet  Take 2 tablets by mouth nightly             tiZANidine (ZANAFLEX) 4 MG tablet  Take 1 tablet by mouth every 6 hours as needed (muscle spasms)             traZODone (DESYREL) 50 MG tablet  Take 1 tablet by mouth nightly as needed for Sleep             venlafaxine (EFFEXOR) 75 MG tablet  Take 75 mg by mouth daily                 Controlled substances monitoring: possible medication side effects, risk of tolerance and/or dependence, and alternative treatments discussed, no signs of potential drug abuse or diversion identified, OARRS report reviewed today- activity consistent with treatment plan and OARRS report reviewed today- 9/25/17.     35 minutes spent preparing the patient for discharge    Pavan Fierro CNP     Patient ready for discharge. Ramp completed last PM.  She is excited to be going home. Prescriptions reviewed. Patient will use home exercise program until weightbearing cleared. Karon Rodríguez requires a standard wheelchair to complete bathing, toileting, dressing and grooming tasks. These tasks cannot be completed by utilizing a cane or a walker secondary to impaired ambulation and mobility. Karon Rodríguez is able to safely maneuver a standard wheelchair in the home.     Karon Rodríguez requires RIGHT elevating legrest due to a musculoskeletal condition or the presence of a cast or brace which prevents 90 degree flexion at the knee.      Brake extension required on the LEFT due to UE limitations in ROM and weight bearing status.   I
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
## 2017-09-25 NOTE — PROGRESS NOTES
Consulted to assess posterior right calf. Patient working with therapy. Will come back to re assess.

## 2017-09-26 VITALS
HEIGHT: 63 IN | OXYGEN SATURATION: 99 % | WEIGHT: 269.4 LBS | SYSTOLIC BLOOD PRESSURE: 110 MMHG | BODY MASS INDEX: 47.73 KG/M2 | RESPIRATION RATE: 16 BRPM | HEART RATE: 82 BPM | TEMPERATURE: 97.9 F | DIASTOLIC BLOOD PRESSURE: 74 MMHG

## 2017-09-26 DIAGNOSIS — S82.141A TIBIAL PLATEAU FRACTURE, RIGHT, CLOSED, INITIAL ENCOUNTER: Primary | ICD-10-CM

## 2017-09-26 DIAGNOSIS — S66.822A EXTENSOR TENDON LACERATION OF LEFT HAND WITH OPEN WOUND, INITIAL ENCOUNTER: ICD-10-CM

## 2017-09-26 DIAGNOSIS — S61.402A EXTENSOR TENDON LACERATION OF LEFT HAND WITH OPEN WOUND, INITIAL ENCOUNTER: ICD-10-CM

## 2017-09-26 PROCEDURE — 99239 HOSP IP/OBS DSCHRG MGMT >30: CPT | Performed by: PHYSICAL MEDICINE & REHABILITATION

## 2017-09-26 PROCEDURE — 97542 WHEELCHAIR MNGMENT TRAINING: CPT

## 2017-09-26 PROCEDURE — 6370000000 HC RX 637 (ALT 250 FOR IP): Performed by: NURSE PRACTITIONER

## 2017-09-26 PROCEDURE — 97530 THERAPEUTIC ACTIVITIES: CPT

## 2017-09-26 PROCEDURE — 97110 THERAPEUTIC EXERCISES: CPT

## 2017-09-26 PROCEDURE — 6360000002 HC RX W HCPCS: Performed by: PHYSICAL MEDICINE & REHABILITATION

## 2017-09-26 RX ADMIN — ENOXAPARIN SODIUM 40 MG: 40 INJECTION SUBCUTANEOUS at 08:18

## 2017-09-26 RX ADMIN — FAMOTIDINE 20 MG: 20 TABLET, FILM COATED ORAL at 08:19

## 2017-09-26 RX ADMIN — DOCUSATE SODIUM 100 MG: 100 CAPSULE ORAL at 08:19

## 2017-09-26 RX ADMIN — VENLAFAXINE 75 MG: 37.5 TABLET ORAL at 08:18

## 2017-09-26 RX ADMIN — HYDROCODONE BITARTRATE AND ACETAMINOPHEN 2 TABLET: 5; 325 TABLET ORAL at 08:18

## 2017-09-26 ASSESSMENT — PAIN DESCRIPTION - PAIN TYPE: TYPE: ACUTE PAIN

## 2017-09-26 ASSESSMENT — PAIN SCALES - GENERAL
PAINLEVEL_OUTOF10: 7
PAINLEVEL_OUTOF10: 0
PAINLEVEL_OUTOF10: 4
PAINLEVEL_OUTOF10: 7
PAINLEVEL_OUTOF10: 4

## 2017-09-26 ASSESSMENT — PAIN DESCRIPTION - LOCATION: LOCATION: RIB CAGE

## 2017-09-26 NOTE — PLAN OF CARE
Problem: DISCHARGE BARRIERS  Goal: Patients continuum of care needs are met  Outcome: Completed Date Met:  09/26/17  Patient is being discharged to her home. A home exercise program was provided by Rehab therapist. Transportation is being provided by her family.

## 2017-09-26 NOTE — PROGRESS NOTES
Spoke with Dr. Alexia Craig. Stated we can leave sutures in left hand until follow up appointment. Follow up appointment made.
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
## 2017-09-26 NOTE — PROGRESS NOTES
Date: 2017  Patient Name: Ned Gutierrez        MRN: [id number]    Account Number: [de-identified]  : 1980  (30 y.o.)  Gender: female   Referring Practitioner: HAYLEY Watson CNP for Dr. Murali Stewart  Diagnosis: TBI    Ned Gutierrez requires a standard wheelchair to complete bathing, toileting, dressing and grooming tasks. These tasks cannot be completed by utilizing a cane or a walker secondary to impaired ambulation and mobility. Ned Gutierrez is able to safely maneuver a standard wheelchair in the home. Ned Gutierrez requires RIGHT elevating legrest due to a musculoskeletal condition or the presence of a cast or brace which prevents 90 degree flexion at the knee. Brake extension required on the LEFT due to UE limitations in ROM and weight bearing status.

## 2017-09-26 NOTE — PROGRESS NOTES
MET  Long term goal 4: Pt to perform stand/pivot transfer Mod I, maintaining NWB RLE, to get in/out of w/c.-NOT MET  Long term goal 5: Pt to get in/out of car, with supervision for transportation needs. -MET  Long term goal 6: Pt to walk with pf RW >= 10 ft, +1 mod I to get in/out of bathroom-NOT MET

## 2017-09-27 LAB
EKG ATRIAL RATE: 115 BPM
EKG P-R INTERVAL: 136 MS
EKG Q-T INTERVAL: 310 MS
EKG QRS DURATION: 92 MS
EKG QTC CALCULATION (BAZETT): 428 MS
EKG R AXIS: 129 DEGREES
EKG T AXIS: 157 DEGREES
EKG VENTRICULAR RATE: 115 BPM

## 2017-09-28 ENCOUNTER — TELEPHONE (OUTPATIENT)
Dept: PHYSICAL MEDICINE AND REHAB | Age: 37
End: 2017-09-28

## 2017-10-19 ENCOUNTER — OFFICE VISIT (OUTPATIENT)
Dept: PHYSICAL MEDICINE AND REHAB | Age: 37
End: 2017-10-19
Payer: MEDICARE

## 2017-10-19 ENCOUNTER — TELEPHONE (OUTPATIENT)
Dept: PHYSICAL MEDICINE AND REHAB | Age: 37
End: 2017-10-19

## 2017-10-19 VITALS
HEIGHT: 63 IN | BODY MASS INDEX: 47.66 KG/M2 | WEIGHT: 269 LBS | DIASTOLIC BLOOD PRESSURE: 88 MMHG | SYSTOLIC BLOOD PRESSURE: 127 MMHG | HEART RATE: 99 BPM

## 2017-10-19 DIAGNOSIS — R41.89 IMPAIRED COGNITION: ICD-10-CM

## 2017-10-19 DIAGNOSIS — S06.9X1D TRAUMATIC BRAIN INJURY, WITH LOSS OF CONSCIOUSNESS OF 30 MINUTES OR LESS, SUBSEQUENT ENCOUNTER: Primary | ICD-10-CM

## 2017-10-19 PROCEDURE — G8484 FLU IMMUNIZE NO ADMIN: HCPCS | Performed by: NURSE PRACTITIONER

## 2017-10-19 PROCEDURE — 99213 OFFICE O/P EST LOW 20 MIN: CPT | Performed by: NURSE PRACTITIONER

## 2017-10-19 PROCEDURE — 1111F DSCHRG MED/CURRENT MED MERGE: CPT | Performed by: NURSE PRACTITIONER

## 2017-10-19 PROCEDURE — G8427 DOCREV CUR MEDS BY ELIG CLIN: HCPCS | Performed by: NURSE PRACTITIONER

## 2017-10-19 PROCEDURE — G8417 CALC BMI ABV UP PARAM F/U: HCPCS | Performed by: NURSE PRACTITIONER

## 2017-10-19 PROCEDURE — 4004F PT TOBACCO SCREEN RCVD TLK: CPT | Performed by: NURSE PRACTITIONER

## 2017-10-19 RX ORDER — TIZANIDINE 4 MG/1
4 TABLET ORAL EVERY 6 HOURS PRN
Qty: 90 TABLET | Refills: 1 | Status: SHIPPED | OUTPATIENT
Start: 2017-10-19 | End: 2018-01-15 | Stop reason: SDUPTHER

## 2017-10-19 RX ORDER — TRAZODONE HYDROCHLORIDE 50 MG/1
50 TABLET ORAL NIGHTLY PRN
Qty: 30 TABLET | Refills: 5 | Status: SHIPPED | OUTPATIENT
Start: 2017-10-19 | End: 2017-12-04 | Stop reason: SDUPTHER

## 2017-10-19 RX ORDER — HYDROCODONE BITARTRATE AND ACETAMINOPHEN 5; 325 MG/1; MG/1
1 TABLET ORAL EVERY 6 HOURS PRN
COMMUNITY
End: 2017-12-04 | Stop reason: SDUPTHER
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
## 2017-12-04 ENCOUNTER — OFFICE VISIT (OUTPATIENT)
Dept: PHYSICAL MEDICINE AND REHAB | Age: 37
End: 2017-12-04
Payer: MEDICARE

## 2017-12-04 VITALS
SYSTOLIC BLOOD PRESSURE: 120 MMHG | HEIGHT: 63 IN | BODY MASS INDEX: 46.64 KG/M2 | WEIGHT: 263.2 LBS | DIASTOLIC BLOOD PRESSURE: 79 MMHG | HEART RATE: 100 BPM

## 2017-12-04 DIAGNOSIS — S06.9X1D TRAUMATIC BRAIN INJURY, WITH LOSS OF CONSCIOUSNESS OF 30 MINUTES OR LESS, SUBSEQUENT ENCOUNTER: Primary | ICD-10-CM

## 2017-12-04 DIAGNOSIS — R41.89 IMPAIRED COGNITION: ICD-10-CM

## 2017-12-04 PROCEDURE — G8417 CALC BMI ABV UP PARAM F/U: HCPCS | Performed by: NURSE PRACTITIONER

## 2017-12-04 PROCEDURE — 99213 OFFICE O/P EST LOW 20 MIN: CPT | Performed by: NURSE PRACTITIONER

## 2017-12-04 PROCEDURE — G8427 DOCREV CUR MEDS BY ELIG CLIN: HCPCS | Performed by: NURSE PRACTITIONER

## 2017-12-04 PROCEDURE — G8484 FLU IMMUNIZE NO ADMIN: HCPCS | Performed by: NURSE PRACTITIONER

## 2017-12-04 PROCEDURE — 1036F TOBACCO NON-USER: CPT | Performed by: NURSE PRACTITIONER

## 2017-12-04 RX ORDER — TRAZODONE HYDROCHLORIDE 100 MG/1
100 TABLET ORAL NIGHTLY PRN
Qty: 30 TABLET | Refills: 5 | Status: SHIPPED | OUTPATIENT
Start: 2017-12-04

## 2017-12-04 RX ORDER — HYDROCODONE BITARTRATE AND ACETAMINOPHEN 5; 325 MG/1; MG/1
1 TABLET ORAL EVERY 8 HOURS PRN
Qty: 90 TABLET | Refills: 0 | Status: SHIPPED | OUTPATIENT
Start: 2017-12-04
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
## 2017-12-07 ENCOUNTER — TELEPHONE (OUTPATIENT)
Dept: PHYSICAL MEDICINE AND REHAB | Age: 37
End: 2017-12-07

## 2017-12-12 ENCOUNTER — TELEPHONE (OUTPATIENT)
Dept: PHYSICAL MEDICINE AND REHAB | Age: 37
End: 2017-12-12

## 2017-12-12 DIAGNOSIS — S12.9XXD CLOSED FRACTURE OF SPINOUS PROCESS OF CERVICAL VERTEBRA, SUBSEQUENT ENCOUNTER: ICD-10-CM

## 2017-12-12 DIAGNOSIS — S32.009D: Primary | ICD-10-CM

## 2017-12-12 NOTE — PROGRESS NOTES
Subjective:      Patient ID: Judah Guerra is a 30 y.o. female.     HPI    Review of Systems    Objective:   Physical Exam    Assessment:      ***      Plan:      ***

## 2017-12-12 NOTE — TELEPHONE ENCOUNTER
LM for pt to call office back to find out where she would like to do her therapy since cleared by Dr. Francisco Lyles.

## 2017-12-14 ENCOUNTER — TELEPHONE (OUTPATIENT)
Dept: PHYSICAL MEDICINE AND REHAB | Age: 37
End: 2017-12-14

## 2017-12-14 DIAGNOSIS — S82.141D CLOSED FRACTURE OF RIGHT TIBIAL PLATEAU WITH ROUTINE HEALING, SUBSEQUENT ENCOUNTER: Primary | ICD-10-CM

## 2017-12-14 NOTE — TELEPHONE ENCOUNTER
Patient called requesting an extension on her wheelchair rental be sent to [address] [place] [address].

## 2018-01-15 ENCOUNTER — OFFICE VISIT (OUTPATIENT)
Dept: PHYSICAL MEDICINE AND REHAB | Age: 38
End: 2018-01-15
Payer: MEDICARE

## 2018-01-15 VITALS
WEIGHT: 263 LBS | SYSTOLIC BLOOD PRESSURE: 101 MMHG | BODY MASS INDEX: 46.6 KG/M2 | HEIGHT: 63 IN | DIASTOLIC BLOOD PRESSURE: 71 MMHG | HEART RATE: 88 BPM

## 2018-01-15 DIAGNOSIS — F41.9 ANXIETY: ICD-10-CM

## 2018-01-15 DIAGNOSIS — S06.9X1D TRAUMATIC BRAIN INJURY, WITH LOSS OF CONSCIOUSNESS OF 30 MINUTES OR LESS, SUBSEQUENT ENCOUNTER: Primary | ICD-10-CM

## 2018-01-15 PROCEDURE — 99213 OFFICE O/P EST LOW 20 MIN: CPT | Performed by: NURSE PRACTITIONER

## 2018-01-15 PROCEDURE — G8427 DOCREV CUR MEDS BY ELIG CLIN: HCPCS | Performed by: NURSE PRACTITIONER

## 2018-01-15 PROCEDURE — G8417 CALC BMI ABV UP PARAM F/U: HCPCS | Performed by: NURSE PRACTITIONER

## 2018-01-15 PROCEDURE — 1036F TOBACCO NON-USER: CPT | Performed by: NURSE PRACTITIONER

## 2018-01-15 PROCEDURE — G8484 FLU IMMUNIZE NO ADMIN: HCPCS | Performed by: NURSE PRACTITIONER

## 2018-01-15 RX ORDER — TIZANIDINE 4 MG/1
4 TABLET ORAL EVERY 6 HOURS PRN
Qty: 90 TABLET | Refills: 2 | Status: SHIPPED | OUTPATIENT
Start: 2018-01-15

## 2018-01-15 NOTE — PROGRESS NOTES
Physical Medicine & Rehabilitation   Outpatient progress note    Chief Complaint:   Chief Complaint   Patient presents with    Follow-up        Subjective: Nilsa Barnes is a 41 y.o. female who returns to the office today for further follow up. Patient has been doing PT/OT/ST. She reports they are going well. Has only had 1 session so far for her neck and back which was painful. Reports she is working on walking with a walker, only does about 15 steps at home 3x/day. She also stands to do dishes and other household tasks. Is working on increasing mobility. Has been taking Norco sparingly for pain. Also taking zanaflex as needed. Sleeping well with trazodone. She continues to have difficulty riding in the car. Has a lot of anxiety. Again, discussed referral to Dr. Bing Ventura. Patient would like to go, however she is reliant on  for transportation at this time because her mother hurt her knee and is unable to drive. Discussed making referral and then she can privately discuss with  and she can decide whether or not to pursue it.      Review of Systems:  CONSTITUTIONAL: negative  RESPIRATORY:  negative  CARDIOVASCULAR:  negative  GASTROINTESTINAL:  negative  GENITOURINARY:  negative  SKIN: positive for scattered bruising and abrasions  HEMATOLOGIC/LYMPHATIC:  positive for swelling/edema  MUSCULOSKELETAL:  positive for pain and decreased range of motion  NEUROLOGICAL:  positive for memory problems, gait problems and pain  BEHAVIOR/PSYCH:  negative  10 point system review otherwise negative    Physical Exam:  /71   Pulse 88   Ht 5' 3\" (1.6 m)   Wt 263 lb (119.3 kg)   BMI 46.59 kg/m²     awake  Orientation:   person, place, time  Mood: talkative  Affect: calm  General appearance: Patient is well nourished, well developed, well groomed and in no acute distress, appearing stated age, normal affect    Memory:   abnormal - deficits noted   Attention/Concentration: normal  Language:

## 2018-06-23 ENCOUNTER — NURSE TRIAGE (OUTPATIENT)
Dept: ADMINISTRATIVE | Age: 38
End: 2018-06-23
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
## (undated) DEVICE — SYRINGE BULB 50/CS 48/PLT: Brand: MEDEGEN MEDICAL PRODUCTS, LLC

## (undated) DEVICE — SUTURE ETHBND EXCEL SZ 4-0 L30IN NONABSORBABLE GRN L17MM X871H

## (undated) DEVICE — GLOVE SURG SZ 9 THK91MIL LTX FREE SYN POLYISOPRENE ANTI

## (undated) DEVICE — DRAPE,EXTREMITY,89X128,STERILE: Brand: MEDLINE

## (undated) DEVICE — GAUZE,SPONGE,8"X4",12PLY,XRAY,STRL,LF: Brand: MEDLINE

## (undated) DEVICE — BANDAGE COMPR W4INXL55YD 1 LAYR COT CLSR DLX E HVY DUTY W

## (undated) DEVICE — MEDI-VAC YANKAUER SUCTION HANDLE W/BULBOUS TIP: Brand: CARDINAL HEALTH

## (undated) DEVICE — SUTURE ETHBND EXCEL SZ 3-0 L30IN NONABSORBABLE GRN V-5 X916H

## (undated) DEVICE — SPLINT ORTH W4XL15IN RL FRM WRINKLE FREE INTLOK PERFRMANCE

## (undated) DEVICE — BANDAGE GZ W45INXL4 1 10YD FLUF RL 6 PLY DERMACEA

## (undated) DEVICE — SUTURE VCRL SZ 3-0 L27IN ABSRB UD FS-2 L19MM 1/2 CIR J423H

## (undated) DEVICE — DRESSING,GAUZE,XEROFORM,CURAD,5"X9",ST: Brand: CURAD

## (undated) DEVICE — DUP USE 304928 DRESSING GZ 12 PLY 4X4 STRL

## (undated) DEVICE — PACK PROCEDURE SURG SET UP SRMC

## (undated) DEVICE — ROYAL SILK SURGICAL GOWN, XXL: Brand: CONVERTORS

## (undated) DEVICE — GLOVE ORANGE PI 8 1/2   MSG9085

## (undated) DEVICE — GAUZE,SPONGE,3"X3",12PLY,STERILE,LF,2'S: Brand: MEDLINE

## (undated) DEVICE — SUREFIT, DUAL DISPERSIVE ELECTRODE, CONTACT QUALITY MONITOR: Brand: SUREFIT

## (undated) DEVICE — LINER GLOVEXL RED CUT RESIST STRL REPEL LT

## (undated) DEVICE — GLOVE ORANGE PI 7   MSG9070

## (undated) DEVICE — SUTURE NONABSORBABLE MONOFILAMENT 4-0 FS-2 18 IN ETHILON 662H

## (undated) DEVICE — GLOVE SURG SZ 65 THK91MIL LTX FREE SYN POLYISOPRENE

## (undated) DEVICE — PADDING CAST W6XL144IN WHT COT SYN RL NONADHESIVE SOF-ROL

## (undated) DEVICE — PADDING CAST W4XL144IN WHT COT SYN RL NONADHESIVE SOF-ROL

## (undated) DEVICE — REGULAR TIP OPTHALMIC SPONGE: Brand: MICROSPONGE

## (undated) DEVICE — 4-PORT MANIFOLD: Brand: NEPTUNE 2

## (undated) DEVICE — BASIC SINGLE BASIN 1-LF: Brand: MEDLINE INDUSTRIES, INC.

## (undated) DEVICE — SUTURE ETHLN SZ 8-0 L5IN NONABSORBABLE BLK L6.5MM BV130-5 2808G

## (undated) DEVICE — TRAY PREP DRY W/ PREM GLV 2 APPL 6 SPNG 2 UNDPD 1 OVERWRAP